# Patient Record
Sex: MALE | Race: WHITE
[De-identification: names, ages, dates, MRNs, and addresses within clinical notes are randomized per-mention and may not be internally consistent; named-entity substitution may affect disease eponyms.]

---

## 2018-01-16 ENCOUNTER — HOSPITAL ENCOUNTER (INPATIENT)
Dept: HOSPITAL 74 - YASAS | Age: 53
LOS: 15 days | Discharge: HOME | DRG: 772 | End: 2018-01-31
Attending: PSYCHIATRY & NEUROLOGY | Admitting: PSYCHIATRY & NEUROLOGY
Payer: COMMERCIAL

## 2018-01-16 VITALS — BODY MASS INDEX: 36 KG/M2

## 2018-01-16 DIAGNOSIS — I25.2: ICD-10-CM

## 2018-01-16 DIAGNOSIS — F11.20: ICD-10-CM

## 2018-01-16 DIAGNOSIS — K21.9: ICD-10-CM

## 2018-01-16 DIAGNOSIS — Z95.5: ICD-10-CM

## 2018-01-16 DIAGNOSIS — I25.118: ICD-10-CM

## 2018-01-16 DIAGNOSIS — I10: ICD-10-CM

## 2018-01-16 DIAGNOSIS — Z91.013: ICD-10-CM

## 2018-01-16 DIAGNOSIS — F19.280: ICD-10-CM

## 2018-01-16 DIAGNOSIS — E78.5: ICD-10-CM

## 2018-01-16 DIAGNOSIS — F13.20: Primary | ICD-10-CM

## 2018-01-16 DIAGNOSIS — B19.20: ICD-10-CM

## 2018-01-16 DIAGNOSIS — F40.01: ICD-10-CM

## 2018-01-16 DIAGNOSIS — F17.213: ICD-10-CM

## 2018-01-16 DIAGNOSIS — F32.9: ICD-10-CM

## 2018-01-16 DIAGNOSIS — F41.9: ICD-10-CM

## 2018-01-16 LAB
APPEARANCE UR: CLEAR
BILIRUB UR STRIP.AUTO-MCNC: NEGATIVE MG/DL
COLOR UR: (no result)
KETONES UR QL STRIP: NEGATIVE
LEUKOCYTE ESTERASE UR QL STRIP.AUTO: NEGATIVE
NITRITE UR QL STRIP: NEGATIVE
PH UR: 5 [PH] (ref 5–8)
PROT UR QL STRIP: NEGATIVE
PROT UR QL STRIP: NEGATIVE
RBC # UR STRIP: NEGATIVE /UL
SP GR UR: 1.01 (ref 1–1.03)
UROBILINOGEN UR STRIP-MCNC: NEGATIVE MG/DL (ref 0.2–1)

## 2018-01-16 PROCEDURE — HZ42ZZZ GROUP COUNSELING FOR SUBSTANCE ABUSE TREATMENT, COGNITIVE-BEHAVIORAL: ICD-10-PCS | Performed by: PSYCHIATRY & NEUROLOGY

## 2018-01-16 RX ADMIN — Medication SCH MG: at 23:09

## 2018-01-16 RX ADMIN — ATORVASTATIN CALCIUM SCH MG: 40 TABLET, FILM COATED ORAL at 23:09

## 2018-01-16 NOTE — HP
Admission ROS S





- [facility]


Chief Complaint: 





I WANT TO GO TO REHAB


Allergies/Adverse Reactions: 


 Allergies











Allergy/AdvReac Type Severity Reaction Status Date / Time


 


shellfish derived Allergy Severe Swelling Verified 18 18:12


 


No Known Drug Allergies Allergy   Verified 18 18:12











History of Present Illness: 





52 YEARS OLD MALE WITH LONG HISTORY OF XANAX NICOTINE DEPENDENCE HAS HEPATITIS 

C GERD HYPERTENSION HYPERLIPIDEMIA HEART ATTACK 2012 DEPRESSION METHADONE 

MAINTENANCE PROGRAM 60 MG PO DAILY IS ADMITTED TO REHAB


Exam Limitations: No Limitations





- Ebola screening


Have you traveled outside of the country in the last 21 days: No


Have you had contact with anyone from an Ebola affected area: No


Have you been sick,other than usual withdrawal symptoms: No


Do you have a fever: No





- Review of Systems


Constitutional: Weight Stable


EENT: reports: No Symptoms Reported


Respiratory: reports: No Symptoms reported


Cardiac: reports: No Symptoms Reported, Other (HEART ATTACK 2012 CARDIAC STENT 

X 2)


GI: reports: Indigestion


: reports: No Symptoms Reported


Musculoskeletal: reports: No Symptoms Reported


Integumentary: reports: No Symptoms Reported


Neuro: reports: No Symptoms reported


Endocrine: reports: No Symptoms Reported


Hematology: reports: No Symptoms Reported


Psychiatric: reports: Judgement Intact, Orientated x3, Depressed


Other Systems: Reviewed and Negative





Patient History





- Patient Medical History


Hx Anemia: No


Hx Asthma: No


Hx Chronic Obstructive Pulmonary Disease (COPD): No


Hx Cancer: No


Hx Cardiac Disorders: Yes (CAD)


Hx Congestive Heart Failure: No


Hx Hypertension: Yes


Hx Hypercholesterolemia: Yes (on med)


Hx Pacemaker: No


HX Cerebrovascular Accident: No


Hx Seizures: No


Hx Dementia: No


Hx Diabetes: No


Hx Gastrointestinal Disorders: Yes


Hx Liver Disease: No


Hx Genitourinary Disorders: No


Hx Sexually Transmitted Disorders: No


Hx Renal Disease (ESRD): No


Hx Thyroid Disease: No


Hx Human Immunodeficiency Virus (HIV): No (last 07/07/15 to 07/11/15 negative)


Hx Hepatitis C: Yes


Hx Depression: Yes


Hx Suicide Attempt: No (denies)


Hx Bipolar Disorder: No


Hx Schizophrenia: No





- Patient Surgical History


Past Surgical History: Yes


Hx Neurologic Surgery: No


Hx Cataract Extraction: No


Hx Cardiac Surgery: Yes (- ptca - 2 stents )


Hx Lung Surgery: No


Hx Breast Surgery: No


Hx Breast Biopsy: No


Hx Abdominal Surgery: No


Hx Appendectomy: No


Hx Cholecystectomy: No


Hx Genitourinary Surgery: No


Hx Orthopedic Surgery: No


Anesthesia Reaction: No





- PPD History


Previous Implant?: Yes


Documented Results: Negative w/o proof


Implanted On Prior R Admission?: No


Date: 16


Results: negative


PPD to be Administered?: Yes





- Smoking Cessation


Smoking history: Current every day smoker


Have you smoked in the past 12 months: Yes


Aproximately how many cigarettes per day: 20


Cigars Per Day: 0


Hx Chewing Tobacco Use: No


Initiated information on smoking cessation: Yes


'Breaking Loose' booklet given: 18





- Substance & Tx. History


Hx Alcohol Use: No


Hx Substance Use: Yes


Substance Use Type: Tranquilizers


Hx Substance Use Treatment: Yes (2018 [facility])





- Substances Abused


  ** Alprazolam (Xanax)


Route: Oral


Frequency: 3-6 times per week


Amount used: 2 MG


Age of first use: 27


Date of Last Use: 18





Family Disease History





- Family Disease History


Family Disease History: Heart Disease: Father (s/p mi  at age 44), 

Mother (qadruple bypass ), Other: Sister (HIV)





Admission Physical Exam [facility]





- Vital Signs


Vital Signs: 


 Vital Signs - 24 hr











  18





  17:27 17:43


 


Temperature 96 F L 97.8 F


 


Pulse Rate 74 88


 


Respiratory 20 20





Rate  


 


Blood Pressure 114/63 161/89














- Physical


General Appearance: Yes: No Apparent Distress, Appropriately Dressed, Obese


HEENTM: Yes: Hearing grossly Normal, Normal ENT Inspection, Normocephalic, 

Normal Voice


Respiratory: Yes: Chest Non-Tender, Lungs Clear, Normal Breath Sounds, No 

Respiratory Distress, No Accessory Muscle Use


Neck: Yes: Supple, Trachea in good position


Breast: Yes: Breasts Symetrical


Cardiology: Yes: Regular Rhythm, Regular Rate, S1, S2


Abdominal: Yes: Normal Bowel Sounds, Non Tender, Soft


Genitourinary: Yes: Within Normal Limits


Back: Yes: Normal Inspection


Musculoskeletal: Yes: full range of Motion, Gait Steady, Muscle Pain (LEFT LEG)


Extremities: Yes: Normal Inspection, Normal Range of Motion, Non-Tender


Neurological: Yes: Fully Oriented, Alert, Motor Strength 5/5, Normal Response, 

Depressed Affect


Integumentary: Yes: Warm


Lymphatic: Yes: Within Normal Limits





- Diagnostic


(1) Hypertension


Current Visit: Yes   Status: Chronic   


Qualifiers: 


   Hypertension type: essential hypertension   Qualified Code(s): I10 - 

Essential (primary) hypertension   





(2) GERD (gastroesophageal reflux disease)


Current Visit: Yes   Status: Chronic   


Qualifiers: 


   Esophagitis presence: without esophagitis   Qualified Code(s): K21.9 - Gastro

-esophageal reflux disease without esophagitis   





(3) History of heart attack


Current Visit: Yes   Status: Resolved   





(4) Alcohol dependence with uncomplicated withdrawal


Current Visit: Yes   Status: Acute   





(5) Nicotine dependence


Current Visit: Yes   Status: Acute   


Qualifiers: 


   Nicotine product type: cigarettes   Substance use status: in withdrawal   

Qualified Code(s): F17.213 - Nicotine dependence, cigarettes, with withdrawal   





(6) Depressive disorder


Current Visit: Yes   Status: Suspected   





(7) Hepatitis C


Current Visit: Yes   Status: Chronic   


Qualifiers: 


   Viral hepatitis chronicity: unspecified   Hepatic coma status: without 

hepatic coma   Qualified Code(s): B19.20 - Unspecified viral hepatitis C 

without hepatic coma   





(8) Methadone maintenance therapy patient


Current Visit: Yes   Status: Chronic   Comment: pt is on 60mg VERIFICATION 

PENDING   





Cleared for Admission [facility]





- Detox or Rehab


[facility] Level of Care: Observation Bed


Detox Regimen/Protocol: Not Applicable


Claeared for Rehab Admission: Yes





[facility] Breath Alcohol Content


Breath Alcohol Content: 0





Urine Drug Screen





- Results


Drug Screen Negative: No


Urine Drug Screen Results: THC-Marijuana





Inpatient Rehab Admission





- Initial Determination


Are CD services needed?: Yes


Free of communicable disease: Yes


Not in need of hospitalization: Yes





- Rehab Admission Criteria


Previous failed treatment: Yes


Poor recovery environment: Yes


Comorbidities: Yes


Lacks judgement: No


Patient is meeting Inpatient Rehab admission criteria:: Yes

## 2018-01-17 LAB
ALBUMIN SERPL-MCNC: 2.8 G/DL (ref 3.4–5)
ALP SERPL-CCNC: 93 U/L (ref 45–117)
ALT SERPL-CCNC: 58 U/L (ref 12–78)
ANION GAP SERPL CALC-SCNC: 8 MMOL/L (ref 8–16)
AST SERPL-CCNC: 33 U/L (ref 15–37)
BILIRUB SERPL-MCNC: 0.5 MG/DL (ref 0.2–1)
BUN SERPL-MCNC: 15 MG/DL (ref 7–18)
CALCIUM SERPL-MCNC: 8.3 MG/DL (ref 8.5–10.1)
CHLORIDE SERPL-SCNC: 103 MMOL/L (ref 98–107)
CO2 SERPL-SCNC: 29 MMOL/L (ref 21–32)
CREAT SERPL-MCNC: 1.1 MG/DL (ref 0.7–1.3)
DEPRECATED RDW RBC AUTO: 15 % (ref 11.9–15.9)
GLUCOSE SERPL-MCNC: 169 MG/DL (ref 74–106)
HCT VFR BLD CALC: 41.3 % (ref 35.4–49)
HGB BLD-MCNC: 13.2 GM/DL (ref 11.7–16.9)
MCH RBC QN AUTO: 28.1 PG (ref 25.7–33.7)
MCHC RBC AUTO-ENTMCNC: 32 G/DL (ref 32–35.9)
MCV RBC: 87.9 FL (ref 80–96)
PLATELET # BLD AUTO: 148 K/MM3 (ref 134–434)
PMV BLD: 9.9 FL (ref 7.5–11.1)
POTASSIUM SERPLBLD-SCNC: 3.7 MMOL/L (ref 3.5–5.1)
PROT SERPL-MCNC: 6.3 G/DL (ref 6.4–8.2)
RBC # BLD AUTO: 4.69 M/MM3 (ref 4–5.6)
SODIUM SERPL-SCNC: 140 MMOL/L (ref 136–145)
WBC # BLD AUTO: 10 K/MM3 (ref 4–10)

## 2018-01-17 RX ADMIN — Medication SCH MG: at 22:15

## 2018-01-17 RX ADMIN — LISINOPRIL SCH MG: 5 TABLET ORAL at 09:25

## 2018-01-17 RX ADMIN — CITALOPRAM HYDROBROMIDE SCH MG: 20 TABLET ORAL at 14:51

## 2018-01-17 RX ADMIN — Medication SCH TAB: at 09:25

## 2018-01-17 RX ADMIN — HYDROXYZINE PAMOATE PRN MG: 50 CAPSULE ORAL at 14:52

## 2018-01-17 RX ADMIN — ATORVASTATIN CALCIUM SCH MG: 40 TABLET, FILM COATED ORAL at 22:15

## 2018-01-17 RX ADMIN — NICOTINE SCH MG: 21 PATCH TRANSDERMAL at 09:25

## 2018-01-17 RX ADMIN — ASPIRIN 81 MG SCH MG: 81 TABLET ORAL at 09:25

## 2018-01-17 RX ADMIN — PANTOPRAZOLE SODIUM SCH MG: 40 TABLET, DELAYED RELEASE ORAL at 09:25
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
## 2018-01-18 RX ADMIN — ATORVASTATIN CALCIUM SCH MG: 40 TABLET, FILM COATED ORAL at 21:08

## 2018-01-18 RX ADMIN — Medication SCH TAB: at 10:14

## 2018-01-18 RX ADMIN — METHADONE HYDROCHLORIDE SCH MG: 40 TABLET ORAL at 06:26

## 2018-01-18 RX ADMIN — ASPIRIN 81 MG SCH MG: 81 TABLET ORAL at 10:14

## 2018-01-18 RX ADMIN — NICOTINE SCH MG: 21 PATCH TRANSDERMAL at 10:14

## 2018-01-18 RX ADMIN — LISINOPRIL SCH MG: 5 TABLET ORAL at 10:14

## 2018-01-18 RX ADMIN — HYDROXYZINE PAMOATE PRN MG: 50 CAPSULE ORAL at 21:08

## 2018-01-18 RX ADMIN — Medication SCH MG: at 21:08

## 2018-01-18 RX ADMIN — HYDROXYZINE PAMOATE PRN MG: 50 CAPSULE ORAL at 10:16

## 2018-01-18 RX ADMIN — PANTOPRAZOLE SODIUM SCH MG: 40 TABLET, DELAYED RELEASE ORAL at 10:15

## 2018-01-18 RX ADMIN — CITALOPRAM HYDROBROMIDE SCH MG: 20 TABLET ORAL at 10:14

## 2018-01-19 RX ADMIN — HYDROXYZINE PAMOATE PRN MG: 50 CAPSULE ORAL at 10:26

## 2018-01-19 RX ADMIN — METHADONE HYDROCHLORIDE SCH MG: 40 TABLET ORAL at 06:46

## 2018-01-19 RX ADMIN — Medication SCH MG: at 21:37

## 2018-01-19 RX ADMIN — Medication SCH TAB: at 10:24

## 2018-01-19 RX ADMIN — PANTOPRAZOLE SODIUM SCH MG: 40 TABLET, DELAYED RELEASE ORAL at 10:25

## 2018-01-19 RX ADMIN — LISINOPRIL SCH MG: 5 TABLET ORAL at 10:27

## 2018-01-19 RX ADMIN — ATORVASTATIN CALCIUM SCH MG: 40 TABLET, FILM COATED ORAL at 21:37

## 2018-01-19 RX ADMIN — CITALOPRAM HYDROBROMIDE SCH MG: 20 TABLET ORAL at 10:25

## 2018-01-19 RX ADMIN — ASPIRIN 81 MG SCH MG: 81 TABLET ORAL at 10:25

## 2018-01-19 RX ADMIN — HYDROXYZINE PAMOATE PRN MG: 50 CAPSULE ORAL at 15:59

## 2018-01-19 RX ADMIN — NICOTINE SCH MG: 21 PATCH TRANSDERMAL at 10:25

## 2018-01-20 RX ADMIN — ASPIRIN 81 MG SCH MG: 81 TABLET ORAL at 10:23

## 2018-01-20 RX ADMIN — HYDROXYZINE PAMOATE PRN MG: 50 CAPSULE ORAL at 10:24

## 2018-01-20 RX ADMIN — LISINOPRIL SCH MG: 5 TABLET ORAL at 10:59

## 2018-01-20 RX ADMIN — PANTOPRAZOLE SODIUM SCH MG: 40 TABLET, DELAYED RELEASE ORAL at 10:23

## 2018-01-20 RX ADMIN — NICOTINE SCH MG: 21 PATCH TRANSDERMAL at 10:23

## 2018-01-20 RX ADMIN — CITALOPRAM HYDROBROMIDE SCH MG: 20 TABLET ORAL at 10:23

## 2018-01-20 RX ADMIN — METHADONE HYDROCHLORIDE SCH MG: 40 TABLET ORAL at 06:30

## 2018-01-20 RX ADMIN — Medication SCH MG: at 21:19

## 2018-01-20 RX ADMIN — ATORVASTATIN CALCIUM SCH MG: 40 TABLET, FILM COATED ORAL at 21:19

## 2018-01-20 RX ADMIN — Medication SCH TAB: at 10:23

## 2018-01-20 RX ADMIN — HYDROXYZINE PAMOATE PRN MG: 50 CAPSULE ORAL at 21:19

## 2018-01-21 RX ADMIN — HYDROXYZINE PAMOATE PRN MG: 50 CAPSULE ORAL at 10:12

## 2018-01-21 RX ADMIN — Medication SCH MG: at 21:51

## 2018-01-21 RX ADMIN — CITALOPRAM HYDROBROMIDE SCH MG: 20 TABLET ORAL at 10:10

## 2018-01-21 RX ADMIN — METHADONE HYDROCHLORIDE SCH MG: 40 TABLET ORAL at 06:02

## 2018-01-21 RX ADMIN — PANTOPRAZOLE SODIUM SCH MG: 40 TABLET, DELAYED RELEASE ORAL at 10:10

## 2018-01-21 RX ADMIN — NICOTINE SCH MG: 21 PATCH TRANSDERMAL at 10:13

## 2018-01-21 RX ADMIN — Medication SCH TAB: at 10:10

## 2018-01-21 RX ADMIN — LISINOPRIL SCH MG: 5 TABLET ORAL at 10:13

## 2018-01-21 RX ADMIN — ASPIRIN 81 MG SCH MG: 81 TABLET ORAL at 10:10

## 2018-01-21 RX ADMIN — HYDROXYZINE PAMOATE PRN MG: 50 CAPSULE ORAL at 21:51

## 2018-01-21 RX ADMIN — ATORVASTATIN CALCIUM SCH MG: 40 TABLET, FILM COATED ORAL at 21:51

## 2018-01-22 RX ADMIN — HYDROXYZINE PAMOATE PRN MG: 50 CAPSULE ORAL at 16:02

## 2018-01-22 RX ADMIN — METHADONE HYDROCHLORIDE SCH MG: 40 TABLET ORAL at 06:13

## 2018-01-22 RX ADMIN — Medication SCH TAB: at 10:00

## 2018-01-22 RX ADMIN — PANTOPRAZOLE SODIUM SCH MG: 40 TABLET, DELAYED RELEASE ORAL at 10:00

## 2018-01-22 RX ADMIN — CITALOPRAM HYDROBROMIDE SCH MG: 20 TABLET ORAL at 10:00

## 2018-01-22 RX ADMIN — HYDROXYZINE PAMOATE PRN MG: 50 CAPSULE ORAL at 21:51

## 2018-01-22 RX ADMIN — ASPIRIN 81 MG SCH MG: 81 TABLET ORAL at 10:00

## 2018-01-22 RX ADMIN — Medication SCH MG: at 21:51

## 2018-01-22 RX ADMIN — ATORVASTATIN CALCIUM SCH MG: 40 TABLET, FILM COATED ORAL at 21:51

## 2018-01-22 RX ADMIN — HYDROXYZINE PAMOATE PRN MG: 50 CAPSULE ORAL at 10:01

## 2018-01-22 RX ADMIN — LISINOPRIL SCH MG: 5 TABLET ORAL at 10:00

## 2018-01-22 RX ADMIN — NICOTINE SCH MG: 21 PATCH TRANSDERMAL at 10:00

## 2018-01-22 NOTE — PN
Psychiatric Progress Note


Vital Signs: 


 Vital Signs











 Period  Temp  Pulse  Resp  BP Sys/Coronado  Pulse Ox


 


 Last 24 Hr  98.3 F  71-80  18-18  118-126/71-74  











Date of Session: 01/22/18


Chief Complaint:: " I feel depressed"


HPI: Patient addressing Sedative Dependence comorbid with Opoid Dependence on 

Agonist Therapy, Anxiety Disorder, Depressive Disorder and Substance-induced 

Anxiety Disorder


ROS: GERD, Hep C, HTN, HLD, MI,


Current Medications: 


Active Medications











Generic Name Dose Route Start Last Admin





  Trade Name Freq  PRN Reason Stop Dose Admin


 


Acetaminophen  650 mg  01/16/18 20:10  





  Tylenol -  PO   





  Q4H PRN   





  FEVER   


 


Al Hydroxide/Mg Hydroxide  30 ml  01/16/18 20:10  





  Mylanta Oral Suspension -  PO   





  Q6H PRN   





  DYSPEPSIA   


 


Aspirin  81 mg  01/17/18 10:00  01/22/18 10:00





  Asa -  PO   81 mg





  DAILY JUAN   Administration


 


Atorvastatin Calcium  40 mg  01/16/18 22:00  01/21/18 21:51





  Lipitor -  PO   40 mg





  HS JUAN   Administration


 


Citalopram Hydrobromide  30 mg  01/23/18 10:00  





  Celexa -  PO   





  DAILY JUAN   


 


Eucalyptus/Menthol/Phenol/Sorbitol  1 each  01/16/18 20:10  





  Cepastat Lozenge -  MM   





  Q4H PRN   





  SORE THROAT   


 


Guaifenesin  10 ml  01/16/18 20:10  





  Robitussin Dm -  PO   





  Q6H PRN   





  COUGH   


 


Hydroxyzine Pamoate  50 mg  01/17/18 12:48  01/22/18 10:01





  Vistaril -  PO   50 mg





  Q4H PRN   Administration





  ANXIETY   


 


Lisinopril  2.5 mg  01/17/18 10:00  01/22/18 10:00





  Prinivil  PO   2.5 mg





  DAILY JUAN   Administration


 


Loperamide HCl  4 mg  01/16/18 20:10  





  Imodium -  PO   





  Q6H PRN   





  DIARRHEA   


 


Magnesium Citrate  300 ml  01/16/18 20:10  





  Citroma -  PO   





  Q48H PRN   





  CONSTIPATION   


 


Magnesium Hydroxide  30 ml  01/16/18 20:10  





  Milk Of Magnesia -  PO   





  DAILY PRN   





  CONSTIPATION   


 


Methadone HCl 40 mg/ Methadone  60 mg  01/18/18 06:00  01/22/18 06:13





  HCl 20 mg  PO  01/24/18 05:59  60 mg





  DAILY@0600 JUAN   Administration


 


Methocarbamol  500 mg  01/16/18 20:12  





  Robaxin -  PO   





  Q8H PRN   





  BACK PAIN   


 


Nicotine  21 mg  01/17/18 10:00  01/22/18 10:00





  Nicoderm Patch -  TD   21 mg





  DAILY JUAN   Administration


 


Nicotine Polacrilex  2 mg  01/16/18 20:10  





  Nicorette Gum -  BUC   





  Q2H PRN   





  NICOTINE REPLACEMENT RX   


 


Pantoprazole Sodium  40 mg  01/17/18 10:00  01/22/18 10:00





  Protonix -  PO   40 mg





  DAILY JUAN   Administration


 


Prenatal Multivit/Folic Acid/Iron  1 tab  01/17/18 10:00  01/22/18 10:00





  Prenatal Vitamins (Sjr) -  PO   1 tab





  DAILY JUAN   Administration


 


Pseudoephedrine/Triprolidine  1 combo  01/16/18 20:10  





  Actifed -  PO   





  TID PRN   





  NASAL CONGESTION   


 


Thiamine HCl  100 mg  01/16/18 22:00  01/21/18 21:51





  Vitamin B1 -  PO   100 mg





  HS JUAN   Administration











Medication(s) Change(s): Increase Celexa dosage to 30 mg po daily


Current Side Effect: No


Lab tests ordered: Yes


Lab tests reviewed: Yes


Provider note:: Patient reports that he has been feeling depressed despite 

taking Celexa 20 mg po daily. Claims that his usual dose of Celexa is 30 mg/

day. Requests for medication to be order at that dose


Total face to face time:: 25





Mental Status Exam





- Mental Status Exam


Alert and Oriented to: Time, Place, Person


Cognitive Function: Fair


Patient Appearance: Well Groomed


Mood: Depressed


Affect: Appropriate


Patient Behavior: Cooperative


Speech Pattern: Clear


Voice Loudness: Normal


Thought Process: Intact, Goal Oriented


Thought Disorder: Not Present


Hallucinations: Denies


Suicidal Ideation: Denies


Homicidal Ideation: Denies


Insight/Judgement: Fair


Sleep: Fair


Appetite: Good


Muscle strength/Tone: Normal


Gait/Station: Normal





Psychiatric Treatment Plan





- Problem List


(1) Sedative hypnotic or anxiolytic dependence


Current Visit: Yes   





(2) Opioid dependence on agonist therapy


Current Visit: No   





(3) Nicotine dependence


Current Visit: Yes   


Qualifiers: 


   Nicotine product type: cigarettes   Substance use status: in withdrawal   

Qualified Code(s): F17.213 - Nicotine dependence, cigarettes, with withdrawal   





(4) Anxiety disorder


Current Visit: Yes   





(5) Panic disorder with agoraphobia


Current Visit: Yes   





(6) Depressive disorder


Current Visit: Yes   





(7) MDD (major depressive disorder)


Current Visit: Yes   





(8) Substance-induced anxiety disorder


Current Visit: Yes   





(9) GERD (gastroesophageal reflux disease)


Current Visit: Yes   


Qualifiers: 


   Esophagitis presence: without esophagitis   Qualified Code(s): K21.9 - Gastro

-esophageal reflux disease without esophagitis   





(10) Hepatitis C


Current Visit: Yes   


Qualifiers: 


   Viral hepatitis chronicity: unspecified   Hepatic coma status: without 

hepatic coma   Qualified Code(s): B19.20 - Unspecified viral hepatitis C 

without hepatic coma   





(11) Hypertension


Current Visit: Yes   


Qualifiers: 


   Hypertension type: essential hypertension   Qualified Code(s): I10 - 

Essential (primary) hypertension   





(12) History of heart attack


Current Visit: Yes   





(13) CAD (coronary artery disease)


Current Visit: No   


Qualifiers: 


   Coronary Disease-Associated Artery/Lesion type: native artery   Native vs. 

transplanted heart: native heart   Associated angina: with stable angina   

Qualified Code(s): I25.118 - Atherosclerotic heart disease of native coronary 

artery with other forms of angina pectoris   





(14) Hyperlipidemia


Current Visit: Yes   


Initial treatment plan: 1) Discontinue Celexa 20 mg po daily.  2) Start Celexa 

30 mg po daily.  3) Monitor progress

## 2018-01-23 RX ADMIN — NICOTINE SCH MG: 21 PATCH TRANSDERMAL at 10:16

## 2018-01-23 RX ADMIN — Medication SCH MG: at 21:31

## 2018-01-23 RX ADMIN — HYDROXYZINE PAMOATE PRN MG: 50 CAPSULE ORAL at 21:31

## 2018-01-23 RX ADMIN — PANTOPRAZOLE SODIUM SCH MG: 40 TABLET, DELAYED RELEASE ORAL at 10:15

## 2018-01-23 RX ADMIN — METHADONE HYDROCHLORIDE SCH MG: 40 TABLET ORAL at 06:17

## 2018-01-23 RX ADMIN — CITALOPRAM HYDROBROMIDE SCH MG: 10 TABLET ORAL at 10:16

## 2018-01-23 RX ADMIN — HYDROXYZINE PAMOATE PRN MG: 50 CAPSULE ORAL at 10:17

## 2018-01-23 RX ADMIN — ASPIRIN 81 MG SCH MG: 81 TABLET ORAL at 10:15

## 2018-01-23 RX ADMIN — LISINOPRIL SCH MG: 5 TABLET ORAL at 10:16

## 2018-01-23 RX ADMIN — ATORVASTATIN CALCIUM SCH MG: 40 TABLET, FILM COATED ORAL at 21:31

## 2018-01-23 RX ADMIN — Medication SCH TAB: at 10:16

## 2018-01-24 RX ADMIN — Medication SCH MG: at 21:28

## 2018-01-24 RX ADMIN — PANTOPRAZOLE SODIUM SCH MG: 40 TABLET, DELAYED RELEASE ORAL at 10:11

## 2018-01-24 RX ADMIN — METHADONE HYDROCHLORIDE SCH MG: 40 TABLET ORAL at 06:38

## 2018-01-24 RX ADMIN — NICOTINE SCH MG: 21 PATCH TRANSDERMAL at 10:10

## 2018-01-24 RX ADMIN — HYDROXYZINE PAMOATE PRN MG: 50 CAPSULE ORAL at 15:31

## 2018-01-24 RX ADMIN — Medication SCH TAB: at 10:10

## 2018-01-24 RX ADMIN — CITALOPRAM HYDROBROMIDE SCH MG: 10 TABLET ORAL at 10:11

## 2018-01-24 RX ADMIN — ATORVASTATIN CALCIUM SCH MG: 40 TABLET, FILM COATED ORAL at 21:28

## 2018-01-24 RX ADMIN — HYDROXYZINE PAMOATE PRN MG: 50 CAPSULE ORAL at 21:28

## 2018-01-24 RX ADMIN — LISINOPRIL SCH MG: 5 TABLET ORAL at 10:11

## 2018-01-24 RX ADMIN — HYDROXYZINE PAMOATE PRN MG: 50 CAPSULE ORAL at 10:12

## 2018-01-24 RX ADMIN — ASPIRIN 81 MG SCH MG: 81 TABLET ORAL at 10:11

## 2018-01-25 RX ADMIN — ASPIRIN 81 MG SCH MG: 81 TABLET ORAL at 10:10

## 2018-01-25 RX ADMIN — Medication SCH TAB: at 10:10

## 2018-01-25 RX ADMIN — NICOTINE SCH MG: 21 PATCH TRANSDERMAL at 10:10

## 2018-01-25 RX ADMIN — Medication SCH MG: at 21:39

## 2018-01-25 RX ADMIN — ATORVASTATIN CALCIUM SCH MG: 40 TABLET, FILM COATED ORAL at 21:39

## 2018-01-25 RX ADMIN — LISINOPRIL SCH MG: 5 TABLET ORAL at 10:10

## 2018-01-25 RX ADMIN — HYDROXYZINE PAMOATE PRN MG: 50 CAPSULE ORAL at 22:14

## 2018-01-25 RX ADMIN — HYDROXYZINE PAMOATE PRN MG: 50 CAPSULE ORAL at 10:11

## 2018-01-25 RX ADMIN — CITALOPRAM HYDROBROMIDE SCH MG: 10 TABLET ORAL at 11:00

## 2018-01-25 RX ADMIN — PANTOPRAZOLE SODIUM SCH MG: 40 TABLET, DELAYED RELEASE ORAL at 10:10

## 2018-01-25 RX ADMIN — METHADONE HYDROCHLORIDE SCH MG: 40 TABLET ORAL at 05:59

## 2018-01-26 RX ADMIN — CITALOPRAM HYDROBROMIDE SCH MG: 10 TABLET ORAL at 10:23

## 2018-01-26 RX ADMIN — ATORVASTATIN CALCIUM SCH MG: 40 TABLET, FILM COATED ORAL at 21:46

## 2018-01-26 RX ADMIN — HYDROXYZINE PAMOATE PRN MG: 50 CAPSULE ORAL at 21:45

## 2018-01-26 RX ADMIN — ASPIRIN 81 MG SCH MG: 81 TABLET ORAL at 10:23

## 2018-01-26 RX ADMIN — METHADONE HYDROCHLORIDE SCH MG: 40 TABLET ORAL at 06:02

## 2018-01-26 RX ADMIN — NICOTINE SCH MG: 21 PATCH TRANSDERMAL at 10:23

## 2018-01-26 RX ADMIN — Medication SCH TAB: at 10:23

## 2018-01-26 RX ADMIN — HYDROXYZINE PAMOATE PRN MG: 50 CAPSULE ORAL at 10:23

## 2018-01-26 RX ADMIN — Medication SCH MG: at 21:45

## 2018-01-26 RX ADMIN — HYDROXYZINE PAMOATE PRN MG: 50 CAPSULE ORAL at 14:43

## 2018-01-26 RX ADMIN — LISINOPRIL SCH MG: 5 TABLET ORAL at 10:23

## 2018-01-26 RX ADMIN — PANTOPRAZOLE SODIUM SCH MG: 40 TABLET, DELAYED RELEASE ORAL at 10:23

## 2018-01-27 RX ADMIN — PANTOPRAZOLE SODIUM SCH MG: 40 TABLET, DELAYED RELEASE ORAL at 10:02

## 2018-01-27 RX ADMIN — ASPIRIN 81 MG SCH MG: 81 TABLET ORAL at 10:01

## 2018-01-27 RX ADMIN — METHADONE HYDROCHLORIDE SCH MG: 40 TABLET ORAL at 06:18

## 2018-01-27 RX ADMIN — ATORVASTATIN CALCIUM SCH MG: 40 TABLET, FILM COATED ORAL at 21:56

## 2018-01-27 RX ADMIN — HYDROXYZINE PAMOATE PRN MG: 50 CAPSULE ORAL at 10:03

## 2018-01-27 RX ADMIN — LISINOPRIL SCH MG: 5 TABLET ORAL at 10:02

## 2018-01-27 RX ADMIN — Medication SCH MG: at 21:56

## 2018-01-27 RX ADMIN — NICOTINE SCH MG: 21 PATCH TRANSDERMAL at 10:01

## 2018-01-27 RX ADMIN — Medication SCH TAB: at 10:02

## 2018-01-27 RX ADMIN — CITALOPRAM HYDROBROMIDE SCH MG: 10 TABLET ORAL at 10:01

## 2018-01-27 RX ADMIN — HYDROXYZINE PAMOATE PRN MG: 50 CAPSULE ORAL at 21:57

## 2018-01-28 RX ADMIN — LISINOPRIL SCH MG: 5 TABLET ORAL at 09:57

## 2018-01-28 RX ADMIN — PANTOPRAZOLE SODIUM SCH MG: 40 TABLET, DELAYED RELEASE ORAL at 09:57

## 2018-01-28 RX ADMIN — Medication SCH TAB: at 09:57

## 2018-01-28 RX ADMIN — NICOTINE SCH: 21 PATCH TRANSDERMAL at 09:57

## 2018-01-28 RX ADMIN — ATORVASTATIN CALCIUM SCH MG: 40 TABLET, FILM COATED ORAL at 22:01

## 2018-01-28 RX ADMIN — METHADONE HYDROCHLORIDE SCH MG: 40 TABLET ORAL at 06:11

## 2018-01-28 RX ADMIN — HYDROXYZINE PAMOATE PRN MG: 50 CAPSULE ORAL at 09:58

## 2018-01-28 RX ADMIN — CITALOPRAM HYDROBROMIDE SCH MG: 10 TABLET ORAL at 09:57

## 2018-01-28 RX ADMIN — ASPIRIN 81 MG SCH MG: 81 TABLET ORAL at 09:57

## 2018-01-28 RX ADMIN — HYDROXYZINE PAMOATE PRN MG: 50 CAPSULE ORAL at 22:02

## 2018-01-28 RX ADMIN — Medication SCH MG: at 22:01

## 2018-01-29 RX ADMIN — CITALOPRAM HYDROBROMIDE SCH MG: 10 TABLET ORAL at 10:20

## 2018-01-29 RX ADMIN — ATORVASTATIN CALCIUM SCH MG: 40 TABLET, FILM COATED ORAL at 21:31

## 2018-01-29 RX ADMIN — Medication SCH TAB: at 10:21

## 2018-01-29 RX ADMIN — NICOTINE SCH: 21 PATCH TRANSDERMAL at 10:21

## 2018-01-29 RX ADMIN — LISINOPRIL SCH MG: 5 TABLET ORAL at 10:21

## 2018-01-29 RX ADMIN — ASPIRIN 81 MG SCH MG: 81 TABLET ORAL at 10:20

## 2018-01-29 RX ADMIN — HYDROXYZINE PAMOATE PRN MG: 50 CAPSULE ORAL at 10:20

## 2018-01-29 RX ADMIN — Medication SCH MG: at 21:32

## 2018-01-29 RX ADMIN — METHADONE HYDROCHLORIDE SCH MG: 40 TABLET ORAL at 06:06

## 2018-01-29 RX ADMIN — PANTOPRAZOLE SODIUM SCH MG: 40 TABLET, DELAYED RELEASE ORAL at 10:21

## 2018-01-29 RX ADMIN — HYDROXYZINE PAMOATE PRN MG: 50 CAPSULE ORAL at 21:32

## 2018-01-30 RX ADMIN — LISINOPRIL SCH MG: 5 TABLET ORAL at 10:11

## 2018-01-30 RX ADMIN — HYDROXYZINE PAMOATE PRN MG: 50 CAPSULE ORAL at 10:10

## 2018-01-30 RX ADMIN — PANTOPRAZOLE SODIUM SCH MG: 40 TABLET, DELAYED RELEASE ORAL at 10:11

## 2018-01-30 RX ADMIN — METHADONE HYDROCHLORIDE SCH MG: 40 TABLET ORAL at 06:15

## 2018-01-30 RX ADMIN — Medication SCH TAB: at 10:13

## 2018-01-30 RX ADMIN — Medication SCH MG: at 21:57

## 2018-01-30 RX ADMIN — ASPIRIN 81 MG SCH MG: 81 TABLET ORAL at 10:11

## 2018-01-30 RX ADMIN — ATORVASTATIN CALCIUM SCH MG: 40 TABLET, FILM COATED ORAL at 21:57

## 2018-01-30 RX ADMIN — CITALOPRAM HYDROBROMIDE SCH MG: 10 TABLET ORAL at 10:11

## 2018-01-30 RX ADMIN — NICOTINE SCH: 21 PATCH TRANSDERMAL at 10:12

## 2018-01-31 VITALS — TEMPERATURE: 97.6 F | SYSTOLIC BLOOD PRESSURE: 148 MMHG | HEART RATE: 62 BPM | DIASTOLIC BLOOD PRESSURE: 76 MMHG

## 2018-01-31 RX ADMIN — NICOTINE SCH: 21 PATCH TRANSDERMAL at 09:57

## 2018-01-31 RX ADMIN — PANTOPRAZOLE SODIUM SCH MG: 40 TABLET, DELAYED RELEASE ORAL at 09:57

## 2018-01-31 RX ADMIN — ASPIRIN 81 MG SCH MG: 81 TABLET ORAL at 09:57

## 2018-01-31 RX ADMIN — HYDROXYZINE PAMOATE PRN MG: 50 CAPSULE ORAL at 09:58

## 2018-01-31 RX ADMIN — Medication SCH TAB: at 09:56

## 2018-01-31 RX ADMIN — LISINOPRIL SCH MG: 5 TABLET ORAL at 09:59

## 2018-01-31 RX ADMIN — CITALOPRAM HYDROBROMIDE SCH MG: 10 TABLET ORAL at 09:56

## 2018-01-31 NOTE — PN
Psychiatric Progress Note


Vital Signs: 


 Vital Signs











 Period  Temp  Pulse  Resp  BP Sys/Coronado  Pulse Ox


 


 Last 24 Hr  97.6 F  62-74  18-20  122-148/71-76  











Date of Session: 01/31/18


Chief Complaint:: Discharge Note


HPI: Patient addressing Sedative Dependence comorbid with Opoid Dependence, 

Nicotine Dependence, Anxiety Disorder, Depressive Disorder and Substance-

induced Anxiety Disorder


ROS: GERD, Hep C, HTN, HLD, S/P MI were medically managed


Current Medications: 


Active Medications











Generic Name Dose Route Start Last Admin





  Trade Name Freq  PRN Reason Stop Dose Admin


 


Acetaminophen  650 mg  01/16/18 20:10  





  Tylenol -  PO   





  Q4H PRN   





  FEVER   


 


Al Hydroxide/Mg Hydroxide  30 ml  01/16/18 20:10  





  Mylanta Oral Suspension -  PO   





  Q6H PRN   





  DYSPEPSIA   


 


Aspirin  81 mg  01/17/18 10:00  01/30/18 10:11





  Asa -  PO   81 mg





  DAILY JUAN   Administration


 


Atorvastatin Calcium  40 mg  01/16/18 22:00  01/30/18 21:57





  Lipitor -  PO   40 mg





  HS JUAN   Administration


 


Citalopram Hydrobromide  30 mg  01/23/18 10:00  01/30/18 10:11





  Celexa -  PO   30 mg





  DAILY JUAN   Administration


 


Eucalyptus/Menthol/Phenol/Sorbitol  1 each  01/16/18 20:10  





  Cepastat Lozenge -  MM   





  Q4H PRN   





  SORE THROAT   


 


Guaifenesin  10 ml  01/16/18 20:10  





  Robitussin Dm -  PO   





  Q6H PRN   





  COUGH   


 


Hydroxyzine Pamoate  50 mg  01/17/18 12:48  01/30/18 10:10





  Vistaril -  PO   50 mg





  Q4H PRN   Administration





  ANXIETY   


 


Lisinopril  2.5 mg  01/17/18 10:00  01/30/18 10:11





  Prinivil  PO   2.5 mg





  DAILY JUAN   Administration


 


Loperamide HCl  4 mg  01/16/18 20:10  





  Imodium -  PO   





  Q6H PRN   





  DIARRHEA   


 


Magnesium Citrate  300 ml  01/16/18 20:10  





  Citroma -  PO   





  Q48H PRN   





  CONSTIPATION   


 


Magnesium Hydroxide  30 ml  01/16/18 20:10  





  Milk Of Magnesia -  PO   





  DAILY PRN   





  CONSTIPATION   


 


Methadone HCl 40 mg/ Methadone  60 mg  01/31/18 06:00  01/31/18 06:06





  HCl 20 mg  PO  02/07/18 05:59  60 mg





  DAILY@0600 JUAN   Administration


 


Methocarbamol  500 mg  01/16/18 20:12  





  Robaxin -  PO   





  Q8H PRN   





  BACK PAIN   


 


Nicotine  21 mg  01/17/18 10:00  01/30/18 10:12





  Nicoderm Patch -  TD   Not Given





  DAILY JUAN   


 


Nicotine Polacrilex  2 mg  01/16/18 20:10  





  Nicorette Gum -  BUC   





  Q2H PRN   





  NICOTINE REPLACEMENT RX   


 


Pantoprazole Sodium  40 mg  01/17/18 10:00  01/30/18 10:11





  Protonix -  PO   40 mg





  DAILY JUAN   Administration


 


Prenatal Multivit/Folic Acid/Iron  1 tab  01/17/18 10:00  01/30/18 10:13





  Prenatal Vitamins (Sjr) -  PO   1 tab





  DAILY JUAN   Administration


 


Pseudoephedrine/Triprolidine  1 combo  01/16/18 20:10  





  Actifed -  PO   





  TID PRN   





  NASAL CONGESTION   


 


Thiamine HCl  100 mg  01/16/18 22:00  01/30/18 21:57





  Vitamin B1 -  PO   100 mg





  HS JUAN   Administration











Current Side Effect: No


Lab tests ordered: Yes


Lab tests reviewed: Yes


Provider note:: Patient has complete this program today. He has met his 

treatment goals and will continue to address his issues in outpatient treatment 

at [facility]. Told writer that from his participation in this program, he 

has learned the importance of making meetings and have a sponsor. He responded 

well to Celexa 30 mg po daily. Script for 30 days supply of that medication is 

electronically transmitted to [facility] at [address] [place] [address] [phone]. 

He is stable for discharge today


Total face to face time:: 35





Mental Status Exam





- Mental Status Exam


Alert and Oriented to: Time, Place, Person


Cognitive Function: Fair


Patient Appearance: Well Groomed


Mood: Hopeful, Euthymic


Affect: Appropriate


Patient Behavior: Cooperative


Speech Pattern: Clear, Artificially Ventilated


Voice Loudness: Limited Variation


Thought Process: Goal Oriented


Thought Disorder: Not Present


Hallucinations: Denies


Suicidal Ideation: Denies


Homicidal Ideation: Denies


Insight/Judgement: Fair


Sleep: Fair


Appetite: Good


Muscle strength/Tone: Normal


Gait/Station: Normal





Psychiatric Treatment Plan





- Problem List


(1) Sedative hypnotic or anxiolytic dependence


Current Visit: Yes   





(2) Opioid dependence on agonist therapy


Current Visit: No   





(3) Nicotine dependence


Current Visit: Yes   


Qualifiers: 


   Nicotine product type: cigarettes   Substance use status: in withdrawal   

Qualified Code(s): F17.213 - Nicotine dependence, cigarettes, with withdrawal   





(4) Anxiety disorder


Current Visit: Yes   





(5) Panic disorder with agoraphobia


Current Visit: Yes   





(6) Depressive disorder


Current Visit: Yes   





(7) MDD (major depressive disorder)


Current Visit: Yes   





(8) Substance-induced anxiety disorder


Current Visit: Yes   





(9) GERD (gastroesophageal reflux disease)


Current Visit: Yes   


Qualifiers: 


   Esophagitis presence: without esophagitis   Qualified Code(s): K21.9 - Gastro

-esophageal reflux disease without esophagitis   





(10) Hepatitis C


Current Visit: Yes   


Qualifiers: 


   Viral hepatitis chronicity: unspecified   Hepatic coma status: without 

hepatic coma   Qualified Code(s): B19.20 - Unspecified viral hepatitis C 

without hepatic coma   





(11) Hypertension


Current Visit: Yes   


Qualifiers: 


   Hypertension type: essential hypertension   Qualified Code(s): I10 - 

Essential (primary) hypertension   





(12) History of heart attack


Current Visit: Yes   





(13) CAD (coronary artery disease)


Current Visit: No   


Qualifiers: 


   Coronary Disease-Associated Artery/Lesion type: native artery   Native vs. 

transplanted heart: native heart   Associated angina: with stable angina   

Qualified Code(s): I25.118 - Atherosclerotic heart disease of native coronary 

artery with other forms of angina pectoris   





(14) Hyperlipidemia


Current Visit: Yes   


Initial treatment plan: Patient is discharged today and referred to [facility] for 

outpatient treatment

## 2019-08-08 ENCOUNTER — HOSPITAL ENCOUNTER (INPATIENT)
Dept: HOSPITAL 74 - YASAS | Age: 54
LOS: 22 days | Discharge: HOME | DRG: 772 | End: 2019-08-30
Attending: NEUROMUSCULOSKELETAL MEDICINE & OMM | Admitting: NEUROMUSCULOSKELETAL MEDICINE & OMM
Payer: COMMERCIAL

## 2019-08-08 VITALS — BODY MASS INDEX: 34.6 KG/M2

## 2019-08-08 DIAGNOSIS — Z95.5: ICD-10-CM

## 2019-08-08 DIAGNOSIS — F14.20: ICD-10-CM

## 2019-08-08 DIAGNOSIS — F13.20: ICD-10-CM

## 2019-08-08 DIAGNOSIS — I25.2: ICD-10-CM

## 2019-08-08 DIAGNOSIS — K21.9: ICD-10-CM

## 2019-08-08 DIAGNOSIS — Z95.1: ICD-10-CM

## 2019-08-08 DIAGNOSIS — I25.119: ICD-10-CM

## 2019-08-08 DIAGNOSIS — F41.0: ICD-10-CM

## 2019-08-08 DIAGNOSIS — G47.00: ICD-10-CM

## 2019-08-08 DIAGNOSIS — Z86.711: ICD-10-CM

## 2019-08-08 DIAGNOSIS — Z79.01: ICD-10-CM

## 2019-08-08 DIAGNOSIS — I10: ICD-10-CM

## 2019-08-08 DIAGNOSIS — F32.9: ICD-10-CM

## 2019-08-08 DIAGNOSIS — E78.5: ICD-10-CM

## 2019-08-08 DIAGNOSIS — F17.210: ICD-10-CM

## 2019-08-08 DIAGNOSIS — F40.00: ICD-10-CM

## 2019-08-08 DIAGNOSIS — F11.20: ICD-10-CM

## 2019-08-08 DIAGNOSIS — F10.20: Primary | ICD-10-CM

## 2019-08-08 PROCEDURE — HZ42ZZZ GROUP COUNSELING FOR SUBSTANCE ABUSE TREATMENT, COGNITIVE-BEHAVIORAL: ICD-10-PCS | Performed by: ALLERGY & IMMUNOLOGY

## 2019-08-08 RX ADMIN — ATORVASTATIN CALCIUM SCH MG: 40 TABLET, FILM COATED ORAL at 21:41

## 2019-08-08 RX ADMIN — Medication SCH MG: at 23:25

## 2019-08-08 RX ADMIN — METOPROLOL TARTRATE SCH: 25 TABLET, FILM COATED ORAL at 21:41

## 2019-08-08 RX ADMIN — NICOTINE SCH: 14 PATCH, EXTENDED RELEASE TRANSDERMAL at 20:33

## 2019-08-08 RX ADMIN — ASPIRIN 81 MG SCH MG: 81 TABLET ORAL at 21:40

## 2019-08-08 RX ADMIN — MONTELUKAST SODIUM SCH: 10 TABLET, COATED ORAL at 23:25

## 2019-08-08 RX ADMIN — RANITIDINE SCH: 150 TABLET ORAL at 23:25

## 2019-08-08 NOTE — PN
Teaching Attending Note


Name of Resident: Ella Avalos





ATTENDING PHYSICIAN STATEMENT





I saw and evaluated the patient.


I reviewed the resident's note and discussed the case with the resident.


I agree with the resident's findings and plan as documented.








SUBJECTIVE: 54 yo with CAD/MI/stent/CABG 2006 and HCV not treated, anxiety, 

here for rehab after completing detox for alcohol/benzo/heroin. In a methadone 

MAT. PCP- 


Was using benzo few times a week, heroin 2 bags IV/day.


using crack cocaine





OBJECTIVE:


 Vital Signs - 24 hr











  08/08/19





  15:09


 


Temperature 98.4 F


 


Pulse Rate 55 L


 


Respiratory 18





Rate 


 


Blood Pressure 123/71








alert and oriented


not tremulous


track marks





a/p: Pt here for rehab after completing detox.

## 2019-08-08 NOTE — HP
CIWA Score





- Admission Criteria


OASAS Guidelines: Admission for Medically Managed Detox: 


Requires at least one of the followin. CIWA greater than 12


2. Seizures within the past 24 hours


3. Delirium tremens within the past 24 hours


4. Hallucinations within the past 24 hours


5. Acute intervention needed for co  occurring medical disorder


6. Acute intervention needed for co  occurring psychiatric disorder


7. Severe withdrawal that cannot be handled at a lower level of care (continued


    vomiting, continued diarrhea, abnormal vital signs) requiring intravenous


    medication and/or fluids


8. Pregnancy








Admission ROS [facility]





- [facility]


Chief Complaint: 


rehab from benzos, crack cocaine, heroin


Allergies/Adverse Reactions: 


 Allergies











Allergy/AdvReac Type Severity Reaction Status Date / Time


 


shellfish derived Allergy Severe Swelling Verified 19 15:10


 


No Known Drug Allergies Allergy   Verified 19 15:10











History of Present Illness: 


54 y/o M with PMH CAD s/p MI w/ CABG (; 2 stents), hep c (not tx), HTN, HLD

, anxiety, panic disorder, agoraphobia, who presents for rehab from benzos, 

heroin, and crack cocaine. Per pt, he was just at [facility] to complete his detox

, which ended today. Was sent here after for rehab.





Was using benzos (xanax) 3x a week, 2 mg at a time. Last use 7 days ago. Uses 

them because it makes him feel "relaxed." Has been using since age 27


Heroin last used 5 days ago; 2 bags via IVDA (RUE, b/l antecubital fossa), no 

hx endocarditis, cellulitis or abscesses. was abstinent for 10 yrs, but started 

using again 7 months ago. Is part of [facility] MTD program. On 120mg qd. Has been 

there for 2.5yrs. Heroin makes him "feel calm"


Smokes crack cocaine, $20/day since age 27





Was at [facility] rehab 2016, detox 2016 w other visits


his goal after rehab is to attend NA meetings, get his 's license back 

and get a part-time job at [employer] or [employer].





PMH: as above


PsxH: CABG as above


meds: singulair, lopressor, lisinopril, plavix, lipitor, asa, protonix, celexa


used to be on lasix but self d/c d/t continued urination


allergies: shellfish


FH: father - [place] age 43. mother - [place]; age 82


SH: rents a room from his sister. smokes 1/2 ppd x since age 19. drug use as 

above





- Ebola screening


Have you traveled outside of the country in the last 21 days: No


Have you had contact with anyone from an Ebola affected area: No


Have you been sick,other than usual withdrawal symptoms: No


Do you have a fever: No





- Review of Systems


Constitutional: No Symptoms Reported


EENT: reports: No Symptoms Reported


Respiratory: reports: No Symptoms reported


Cardiac: reports: No Symptoms Reported


GI: reports: No Symptoms Reported


: reports: No Symptoms Reported


Musculoskeletal: reports: No Symptoms Reported


Integumentary: reports: No Symptoms Reported


Neuro: reports: No Symptoms reported


Endocrine: reports: No Symptoms Reported


Hematology: reports: No Symptoms Reported


Psychiatric: reports: Orientated x3





Patient History





- Patient Medical History


Hx Anemia: No


Hx Asthma: No


Hx Chronic Obstructive Pulmonary Disease (COPD): No


Hx Cancer: No


Hx Cardiac Disorders: Yes (CABG, MI 2 stents)


Hx Congestive Heart Failure: No


Hx Hypertension: Yes


Hx Hypercholesterolemia: Yes (on med)


Hx Pacemaker: No


HX Cerebrovascular Accident: No


Hx Seizures: No


Hx Dementia: No


Hx Diabetes: No


Hx Gastrointestinal Disorders: No


Hx Liver Disease: No


Hx Genitourinary Disorders: No


Hx Sexually Transmitted Disorders: No


Hx Renal Disease (ESRD): No


Hx Thyroid Disease: No


Hx Human Immunodeficiency Virus (HIV): No (last 07/07/15 to 07/11/15 negative)


Hx Hepatitis C: Yes (not tx)


Hx Depression: No


Hx Suicide Attempt: No


Hx Bipolar Disorder: No


Hx Schizophrenia: No


Other Medical History: anxiety, panic disorder, agoraphobia





- Patient Surgical History


Past Surgical History: Yes


Hx Neurologic Surgery: No


Hx Cataract Extraction: No


Hx Cardiac Surgery: Yes ( ptca - 2 stents )


Hx Lung Surgery: No


Hx Breast Surgery: No


Hx Breast Biopsy: No


Hx Abdominal Surgery: No


Hx Appendectomy: No


Hx Cholecystectomy: No


Hx Genitourinary Surgery: No


Hx  Section: No


Hx Orthopedic Surgery: No


Anesthesia Reaction: No





- PPD History


Documented Results: Negative w/o proof


Date: 18


Results: negative


PPD to be Administered?: Yes





- Reproductive History


Patient is a Female of Child Bearing Age (11 -55 yrs old): No





- Smoking Cessation


Smoking history: Current every day smoker


Have you smoked in the past 12 months: Yes


Aproximately how many cigarettes per day: 20


Cigars Per Day: 0


Hx Chewing Tobacco Use: No


Initiated information on smoking cessation: Yes


'Breaking Loose' booklet given: 19





- Substance & Tx. History


Hx Alcohol Use: No


Substance Use Type: Cocaine, Heroin


Hx Substance Use Treatment: Yes ([facility] detox recent . [facility] previously 2016)





- Substances abused


  ** Heroin


Substance route: Injection


Frequency: Daily


Amount used: 2 bags


Age of first use: 19


Date of last use: 19





  ** Alcohol


Date of last use: 19





  ** Crack


Substance route: Injection


Frequency: Daily


Amount used: $20 dollars


Age of first use: 19


Date of last use: 19





  ** Alprazolam (Xanax)


Substance route: Oral


Frequency: 3-6 times per week


Amount used: 1 stick


Age of first use: 27


Date of last use: 19





Family Disease History





- Family Disease History


Family Disease History: Heart Disease: Father (s/p mi  at age 44), 

Mother (quadruple bypass ), Other: Sister (HIV)





Admission Physical Exam [facility]





- Vital Signs


Vital Signs: 


 Vital Signs - 24 hr











  19





  15:09


 


Temperature 98.4 F


 


Pulse Rate 55 L


 


Respiratory 18





Rate 


 


Blood Pressure 123/71














- Physical


General Appearance: Yes: Within Normal Limits


HEENTM: Yes: Within Normal Limits, Hearing grossly Normal


Respiratory: Yes: Lungs Clear


Neck: Yes: Supple


Breast: Yes: Breast Exam Deferred


Cardiology: Yes: Regular Rhythm, Regular Rate, S1, S2


Abdominal: Yes: Within Normal Limits, Flat, Soft


Genitourinary: Yes: Within Normal Limits


Back: Yes: Within Normal Limits


Musculoskeletal: Yes: full range of Motion


Extremities: Yes: Other (+track marks UE)


Neurological: Yes: CNs II-XII NML intact


Integumentary: Yes: Dry, Warm


Lymphatic: Yes: Within Normal Limits





- Diagnostic


(1) Cocaine use disorder


Current Visit: Yes   Status: Chronic   





(2) Opiate misuse


Current Visit: Yes   Status: Chronic   





(3) Moderate benzodiazepine use disorder


Current Visit: Yes   Status: Chronic   





(4) IVDU (intravenous drug user)


Current Visit: Yes   Status: Chronic   





(5) Agoraphobia


Current Visit: Yes   Status: Chronic   





(6) Anxiety disorder


Current Visit: Yes   Status: Chronic   





(7) Hepatitis C


Current Visit: Yes   Status: Chronic   


Qualifiers: 


   Viral hepatitis chronicity: unspecified   Hepatic coma status: without 

hepatic coma   Qualified Code(s): B19.20 - Unspecified viral hepatitis C 

without hepatic coma   





(8) Hyperlipidemia


Current Visit: Yes   Status: Chronic   





(9) Hypertension


Current Visit: Yes   Status: Chronic   


Qualifiers: 


   Hypertension type: essential hypertension   Qualified Code(s): I10 - 

Essential (primary) hypertension   





Cleared for Admission [facility]





- Detox or Rehab


Detox Regimen/Protocol: Not Applicable


Claeared for Rehab Admission: Yes





Breathalyzer





- Breathalyzer


Breathalyzer: 0





Urine Drug Screen





- Test Device


Lot number: [id number]


Expiration date: 21





- Control


Is test valid?: Yes





- Results


Drug screen NEGATIVE: No


Urine drug screen results: FEN-Fentanyl, MTD-Methadone, BZO-Benzodiazepines





Inpatient Rehab Admission





- Rehab Decision to Admit


Inpatient rehab admission?: Yes





- Initial Determination


Are CD services needed?: Yes


Free of communicable disease: Yes


Not in need of hospitalization: Yes





- Rehab Admission Criteria


Previous failed treatment: Yes


Poor recovery environment: Yes


Comorbidities: Yes


Lacks judgement: No


Patient is meeting Inpatient Rehab admission criteria:: Yes

## 2019-08-09 LAB
ALBUMIN SERPL-MCNC: 3.4 G/DL (ref 3.4–5)
ALP SERPL-CCNC: 84 U/L (ref 45–117)
ALT SERPL-CCNC: 45 U/L (ref 13–61)
ANION GAP SERPL CALC-SCNC: 7 MMOL/L (ref 8–16)
APPEARANCE UR: CLEAR
AST SERPL-CCNC: 31 U/L (ref 15–37)
BILIRUB SERPL-MCNC: 0.4 MG/DL (ref 0.2–1)
BILIRUB UR STRIP.AUTO-MCNC: NEGATIVE MG/DL
BUN SERPL-MCNC: 13.2 MG/DL (ref 7–18)
CALCIUM SERPL-MCNC: 9.1 MG/DL (ref 8.5–10.1)
CHLORIDE SERPL-SCNC: 103 MMOL/L (ref 98–107)
CO2 SERPL-SCNC: 29 MMOL/L (ref 21–32)
COLOR UR: YELLOW
CREAT SERPL-MCNC: 1 MG/DL (ref 0.55–1.3)
DEPRECATED RDW RBC AUTO: 14.9 % (ref 11.9–15.9)
GLUCOSE SERPL-MCNC: 198 MG/DL (ref 74–106)
HCT VFR BLD CALC: 42.6 % (ref 35.4–49)
HGB BLD-MCNC: 14 GM/DL (ref 11.7–16.9)
KETONES UR QL STRIP: NEGATIVE
LEUKOCYTE ESTERASE UR QL STRIP.AUTO: NEGATIVE
MCH RBC QN AUTO: 29 PG (ref 25.7–33.7)
MCHC RBC AUTO-ENTMCNC: 32.8 G/DL (ref 32–35.9)
MCV RBC: 88.3 FL (ref 80–96)
NITRITE UR QL STRIP: NEGATIVE
PH UR: 6 [PH] (ref 5–8)
PLATELET # BLD AUTO: 191 K/MM3 (ref 134–434)
PMV BLD: 9.9 FL (ref 7.5–11.1)
POTASSIUM SERPLBLD-SCNC: 4.4 MMOL/L (ref 3.5–5.1)
PROT SERPL-MCNC: 7.8 G/DL (ref 6.4–8.2)
PROT UR QL STRIP: NEGATIVE
PROT UR QL STRIP: NEGATIVE
RBC # BLD AUTO: 4.83 M/MM3 (ref 4–5.6)
SODIUM SERPL-SCNC: 140 MMOL/L (ref 136–145)
SP GR UR: 1.02 (ref 1.01–1.03)
UROBILINOGEN UR STRIP-MCNC: 0.2 MG/DL (ref 0.2–1)
WBC # BLD AUTO: 7.3 K/MM3 (ref 4–10)

## 2019-08-09 RX ADMIN — Medication SCH MG: at 21:40

## 2019-08-09 RX ADMIN — CLOPIDOGREL BISULFATE SCH MG: 75 TABLET, FILM COATED ORAL at 07:01

## 2019-08-09 RX ADMIN — Medication SCH TAB: at 10:27

## 2019-08-09 RX ADMIN — METOPROLOL TARTRATE SCH MG: 25 TABLET, FILM COATED ORAL at 22:10

## 2019-08-09 RX ADMIN — RANITIDINE SCH MG: 150 TABLET ORAL at 11:03

## 2019-08-09 RX ADMIN — ASPIRIN 81 MG SCH MG: 81 TABLET ORAL at 10:27

## 2019-08-09 RX ADMIN — NICOTINE SCH MG: 14 PATCH, EXTENDED RELEASE TRANSDERMAL at 10:28

## 2019-08-09 RX ADMIN — METHADONE HYDROCHLORIDE SCH MG: 40 TABLET ORAL at 10:28

## 2019-08-09 RX ADMIN — ATORVASTATIN CALCIUM SCH MG: 40 TABLET, FILM COATED ORAL at 21:40

## 2019-08-09 RX ADMIN — LISINOPRIL SCH MG: 5 TABLET ORAL at 10:28

## 2019-08-09 RX ADMIN — METOPROLOL TARTRATE SCH MG: 25 TABLET, FILM COATED ORAL at 11:03

## 2019-08-09 RX ADMIN — MONTELUKAST SODIUM SCH MG: 10 TABLET, COATED ORAL at 21:40

## 2019-08-09 RX ADMIN — RANITIDINE SCH MG: 150 TABLET ORAL at 21:40

## 2019-08-10 RX ADMIN — METHADONE HYDROCHLORIDE SCH MG: 40 TABLET ORAL at 05:55

## 2019-08-10 RX ADMIN — Medication SCH MG: at 21:29

## 2019-08-10 RX ADMIN — RANITIDINE SCH MG: 150 TABLET ORAL at 10:23

## 2019-08-10 RX ADMIN — ATORVASTATIN CALCIUM SCH MG: 40 TABLET, FILM COATED ORAL at 21:29

## 2019-08-10 RX ADMIN — NICOTINE SCH: 14 PATCH, EXTENDED RELEASE TRANSDERMAL at 10:24

## 2019-08-10 RX ADMIN — Medication SCH TAB: at 10:23

## 2019-08-10 RX ADMIN — LISINOPRIL SCH MG: 5 TABLET ORAL at 10:23

## 2019-08-10 RX ADMIN — RANITIDINE SCH MG: 150 TABLET ORAL at 21:29

## 2019-08-10 RX ADMIN — METOPROLOL TARTRATE SCH MG: 25 TABLET, FILM COATED ORAL at 10:23

## 2019-08-10 RX ADMIN — MONTELUKAST SODIUM SCH: 10 TABLET, COATED ORAL at 21:29

## 2019-08-10 RX ADMIN — ASPIRIN 81 MG SCH MG: 81 TABLET ORAL at 10:23

## 2019-08-10 RX ADMIN — METOPROLOL TARTRATE SCH MG: 25 TABLET, FILM COATED ORAL at 21:29

## 2019-08-10 RX ADMIN — CLOPIDOGREL BISULFATE SCH MG: 75 TABLET, FILM COATED ORAL at 07:48

## 2019-08-11 RX ADMIN — Medication SCH MG: at 21:35

## 2019-08-11 RX ADMIN — LISINOPRIL SCH MG: 5 TABLET ORAL at 09:44

## 2019-08-11 RX ADMIN — ATORVASTATIN CALCIUM SCH MG: 40 TABLET, FILM COATED ORAL at 21:35

## 2019-08-11 RX ADMIN — METOPROLOL TARTRATE SCH MG: 25 TABLET, FILM COATED ORAL at 21:35

## 2019-08-11 RX ADMIN — METHADONE HYDROCHLORIDE SCH MG: 40 TABLET ORAL at 06:06

## 2019-08-11 RX ADMIN — NICOTINE SCH: 14 PATCH, EXTENDED RELEASE TRANSDERMAL at 09:44

## 2019-08-11 RX ADMIN — ASPIRIN 81 MG SCH MG: 81 TABLET ORAL at 09:44

## 2019-08-11 RX ADMIN — METOPROLOL TARTRATE SCH MG: 25 TABLET, FILM COATED ORAL at 09:45

## 2019-08-11 RX ADMIN — Medication PRN MG: at 21:36

## 2019-08-11 RX ADMIN — CLOPIDOGREL BISULFATE SCH MG: 75 TABLET, FILM COATED ORAL at 07:46

## 2019-08-11 RX ADMIN — RANITIDINE SCH MG: 150 TABLET ORAL at 21:35

## 2019-08-11 RX ADMIN — MONTELUKAST SODIUM SCH: 10 TABLET, COATED ORAL at 21:35

## 2019-08-11 RX ADMIN — Medication SCH TAB: at 09:45

## 2019-08-11 RX ADMIN — RANITIDINE SCH MG: 150 TABLET ORAL at 09:44

## 2019-08-12 RX ADMIN — MONTELUKAST SODIUM SCH: 10 TABLET, COATED ORAL at 21:27

## 2019-08-12 RX ADMIN — ASPIRIN 81 MG SCH MG: 81 TABLET ORAL at 10:25

## 2019-08-12 RX ADMIN — NICOTINE SCH: 14 PATCH, EXTENDED RELEASE TRANSDERMAL at 10:25

## 2019-08-12 RX ADMIN — METOPROLOL TARTRATE SCH MG: 25 TABLET, FILM COATED ORAL at 21:26

## 2019-08-12 RX ADMIN — ATORVASTATIN CALCIUM SCH MG: 40 TABLET, FILM COATED ORAL at 21:26

## 2019-08-12 RX ADMIN — RANITIDINE SCH MG: 150 TABLET ORAL at 21:25

## 2019-08-12 RX ADMIN — Medication SCH MG: at 21:27

## 2019-08-12 RX ADMIN — METOPROLOL TARTRATE SCH: 25 TABLET, FILM COATED ORAL at 10:25

## 2019-08-12 RX ADMIN — RANITIDINE SCH MG: 150 TABLET ORAL at 10:25

## 2019-08-12 RX ADMIN — CLOPIDOGREL BISULFATE SCH MG: 75 TABLET, FILM COATED ORAL at 08:23

## 2019-08-12 RX ADMIN — LISINOPRIL SCH MG: 5 TABLET ORAL at 10:25

## 2019-08-12 RX ADMIN — METHADONE HYDROCHLORIDE SCH MG: 40 TABLET ORAL at 05:57

## 2019-08-12 RX ADMIN — Medication SCH TAB: at 10:25

## 2019-08-12 RX ADMIN — Medication PRN MG: at 21:27

## 2019-08-12 NOTE — PN
[facility] Progress Note


Note: 





Psychiatry


Attending's note :


Abnormal EKG.


Sinus bradycardia.


Writer contacted LICHA Stuart


Via telephone [phone]). 


 For follow-up.

## 2019-08-12 NOTE — CONSULT
[facility] Psychiatric Consult





- Data


Date of interview: 08/12/19


Admission source: Referral from [facility].


Identifying data: Day 5 of rehabilitation for this 52y/o  male, 

directly admitted to [address] after completion of detoxification at 

[facility]. Patient is , a father of one, domiciled, unemployed and 

supported on SSI benefits.


Substance Abuse History: Smoking history: Current every day smoker.  Have you 

smoked in the past 12 months: Yes.  Aproximately how many cigarettes per day: 

20.  Cigars Per Day: 0.  Hx Chewing Tobacco Use: No.  Initiated information on 

smoking cessation: Yes.  'Breaking Loose' booklet given: 08/08/19.  - Substance 

& Tx. History.  Hx Alcohol Use: No.  Substance Use Type: Cocaine, Heroin.  Hx 

Substance Use Treatment: Yes ([facility] detox recent . [facility] previously 2016).  - 

Substances abused.  ** Heroin.  Substance route: Injection.  Frequency: Daily.  

Amount used: 2 bags.  Age of first use: 19.  Date of last use: 08/03/19.  ** 

Alcohol.  Date of last use: 08/03/19.  ** Crack.  Substance route: smoking.  

Frequency: Daily.  Amount used: $20 dollars.  Age of first use: 19.  Date of 

last use: 08/03/19.  ** Alprazolam (Xanax).  Substance route: Oral.  Frequency: 

3-6 times per week.  Amount used: 1 stick.  Age of first use: 27.  Date of last 

use: 08/01/19


Medical History: Medical profile is remarkable for a history of myocardial 

infarction in 2011 (two stents), hypertension, dyslipidemia, coronary artery 

disease and hepatitis C.


Psychiatric History: No reported history of psychiatric hospitalizations.He 

continues to address his issues (substance use disorders, MDD, Panic Disorder) 

at the [facility] OPD clinic (Amie/Tian).Medicated with celexa 20 mg/day + 

klonopin 1 mg/bid (Dr Peguero).Mr Schrader is currently on methadone 

maintenance (120 mg/day) at [facility]. Patient denies history of suicide attempts.


Physical/Sexual Abuse/Trauma History: Patient denies history of abuse.


Additional Comment: Urine drug screen results: FEN-Fentanyl, MTD-Methadone, BZO-

Benzodiazepines. Noted.





Mental Status Exam





- Mental Status Exam


Alert and Oriented to: Time, Place, Person


Cognitive Function: Good


Patient Appearance: Well Groomed


Mood: Hopeful, Euthymic


Affect: Appropriate, Normal Range


Patient Behavior: Appropriate, Cooperative


Speech Pattern: Clear, Appropriate


Voice Loudness: Normal


Thought Process: Intact, Goal Oriented


Thought Disorder: Not Present


Hallucinations: Denies


Suicidal Ideation: Denies


Homicidal Ideation: Denies


Insight/Judgement: Fair


Sleep: Poorly, Difficulty falling asleep


Appetite: Good


Muscle strength/Tone: Normal


Gait/Station: Normal





Psychiatric Findings





- Problem List (Axis 1, 2,3)


(1) Opioid dependence on agonist therapy


Current Visit: Yes   Status: Chronic   





(2) Moderate benzodiazepine use disorder


Current Visit: Yes   Status: Chronic   





(3) Cocaine dependence


Current Visit: Yes   Status: Chronic   





(4) Nicotine dependence


Current Visit: Yes   Status: Chronic   


Qualifiers: 


   Nicotine product type: cigarettes   Substance use status: in withdrawal   

Qualified Code(s): F17.213 - Nicotine dependence, cigarettes, with withdrawal   





(5) Depressive disorder


Current Visit: Yes   Status: Chronic   





(6) History of panic disorder


Current Visit: Yes   Status: Chronic   





(7) Insomnia


Current Visit: Yes   Status: Chronic   





- Initial Treatment Plan


Initial Treatment Plan: Psychoeducation. Sleep hygiene. NA meetings. 

Counseling. Medications : celexa 20 mg po daily. Side effects/benefits are 

discussed with patient. Gave consent (verbal) to MD. Observation.

## 2019-08-13 RX ADMIN — MONTELUKAST SODIUM SCH: 10 TABLET, COATED ORAL at 21:54

## 2019-08-13 RX ADMIN — ASPIRIN 81 MG SCH MG: 81 TABLET ORAL at 10:17

## 2019-08-13 RX ADMIN — CLOPIDOGREL BISULFATE SCH MG: 75 TABLET, FILM COATED ORAL at 07:51

## 2019-08-13 RX ADMIN — ACETAMINOPHEN PRN MG: 325 TABLET ORAL at 07:47

## 2019-08-13 RX ADMIN — Medication PRN MG: at 21:56

## 2019-08-13 RX ADMIN — LISINOPRIL SCH MG: 5 TABLET ORAL at 10:17

## 2019-08-13 RX ADMIN — METHADONE HYDROCHLORIDE SCH MG: 40 TABLET ORAL at 05:56

## 2019-08-13 RX ADMIN — RANITIDINE SCH MG: 150 TABLET ORAL at 21:53

## 2019-08-13 RX ADMIN — Medication SCH MG: at 21:54

## 2019-08-13 RX ADMIN — RANITIDINE SCH MG: 150 TABLET ORAL at 10:17

## 2019-08-13 RX ADMIN — Medication SCH TAB: at 10:17

## 2019-08-13 RX ADMIN — ATORVASTATIN CALCIUM SCH MG: 40 TABLET, FILM COATED ORAL at 21:55

## 2019-08-13 RX ADMIN — METOPROLOL TARTRATE SCH MG: 25 TABLET, FILM COATED ORAL at 10:16

## 2019-08-13 RX ADMIN — METOPROLOL TARTRATE SCH MG: 25 TABLET, FILM COATED ORAL at 21:56

## 2019-08-13 RX ADMIN — NICOTINE SCH: 14 PATCH, EXTENDED RELEASE TRANSDERMAL at 10:17

## 2019-08-14 RX ADMIN — METHADONE HYDROCHLORIDE SCH MG: 40 TABLET ORAL at 05:50

## 2019-08-14 RX ADMIN — NICOTINE SCH: 14 PATCH, EXTENDED RELEASE TRANSDERMAL at 10:01

## 2019-08-14 RX ADMIN — ASPIRIN 81 MG SCH MG: 81 TABLET ORAL at 10:01

## 2019-08-14 RX ADMIN — Medication SCH MG: at 21:46

## 2019-08-14 RX ADMIN — Medication SCH TAB: at 10:01

## 2019-08-14 RX ADMIN — MONTELUKAST SODIUM SCH: 10 TABLET, COATED ORAL at 21:47

## 2019-08-14 RX ADMIN — CLOPIDOGREL BISULFATE SCH MG: 75 TABLET, FILM COATED ORAL at 07:23

## 2019-08-14 RX ADMIN — METOPROLOL TARTRATE SCH MG: 25 TABLET, FILM COATED ORAL at 21:46

## 2019-08-14 RX ADMIN — METOPROLOL TARTRATE SCH MG: 25 TABLET, FILM COATED ORAL at 10:01

## 2019-08-14 RX ADMIN — ATORVASTATIN CALCIUM SCH MG: 40 TABLET, FILM COATED ORAL at 21:46

## 2019-08-14 RX ADMIN — RANITIDINE SCH MG: 150 TABLET ORAL at 10:01

## 2019-08-14 RX ADMIN — LISINOPRIL SCH MG: 5 TABLET ORAL at 10:01

## 2019-08-14 RX ADMIN — RANITIDINE SCH MG: 150 TABLET ORAL at 21:46

## 2019-08-14 NOTE — PN
[facility] Progress Note


Note: 





EKG done 8/12 shows sinus liz- rate 51, non specific ST abnormality. Pt has h/

o MI with stent placement. no chest pain at the present time

## 2019-08-14 NOTE — PN
[facility] Progress Note


Note: 





Psychiatry


Attending's note (follow-up) :


Case discussed, via telephone, with Dr Hahn.


EKG reviewed. Dr Hahn's note : appreciated. 


Will resume citalopram 20 mg po daily at patient's request.


Mr Schrader is already known to this writer. 


No history of adverse effects from citalopram.


Records yield no evidence of past treatment with seroquel.


Patient was reportedly using " old, left over pills " at home. 


Not prescribed by his outpatient physician.

## 2019-08-14 NOTE — EKG
Test Reason : 

Blood Pressure : ***/*** mmHG

Vent. Rate : 051 BPM     Atrial Rate : 051 BPM

   P-R Int : 142 ms          QRS Dur : 096 ms

    QT Int : 470 ms       P-R-T Axes : 039 007 082 degrees

   QTc Int : 433 ms

 

SINUS BRADYCARDIA

NONSPECIFIC ST ABNORMALITY

ABNORMAL ECG

NO PREVIOUS ECGS AVAILABLE

Confirmed by VESNA FALK, PASQUALE ([id number]) on 8/14/2019 11:48:12 AM

 

Referred By:             Confirmed By:PASQUALE MALAGON MD

## 2019-08-15 RX ADMIN — METOPROLOL TARTRATE SCH MG: 25 TABLET, FILM COATED ORAL at 09:36

## 2019-08-15 RX ADMIN — RANITIDINE SCH MG: 150 TABLET ORAL at 09:36

## 2019-08-15 RX ADMIN — LISINOPRIL SCH MG: 5 TABLET ORAL at 09:36

## 2019-08-15 RX ADMIN — Medication SCH TAB: at 09:36

## 2019-08-15 RX ADMIN — METOPROLOL TARTRATE SCH MG: 25 TABLET, FILM COATED ORAL at 21:23

## 2019-08-15 RX ADMIN — CITALOPRAM HYDROBROMIDE SCH MG: 20 TABLET ORAL at 09:37

## 2019-08-15 RX ADMIN — CLOPIDOGREL BISULFATE SCH MG: 75 TABLET, FILM COATED ORAL at 07:10

## 2019-08-15 RX ADMIN — MONTELUKAST SODIUM SCH: 10 TABLET, COATED ORAL at 21:24

## 2019-08-15 RX ADMIN — RANITIDINE SCH MG: 150 TABLET ORAL at 21:23

## 2019-08-15 RX ADMIN — METHADONE HYDROCHLORIDE SCH MG: 40 TABLET ORAL at 06:27

## 2019-08-15 RX ADMIN — NICOTINE SCH: 14 PATCH, EXTENDED RELEASE TRANSDERMAL at 09:38

## 2019-08-15 RX ADMIN — ASPIRIN 81 MG SCH MG: 81 TABLET ORAL at 09:36

## 2019-08-15 RX ADMIN — ATORVASTATIN CALCIUM SCH MG: 40 TABLET, FILM COATED ORAL at 21:23

## 2019-08-15 RX ADMIN — Medication SCH MG: at 21:23

## 2019-08-16 RX ADMIN — METOPROLOL TARTRATE SCH MG: 25 TABLET, FILM COATED ORAL at 10:19

## 2019-08-16 RX ADMIN — Medication SCH MG: at 21:21

## 2019-08-16 RX ADMIN — RANITIDINE SCH MG: 150 TABLET ORAL at 10:19

## 2019-08-16 RX ADMIN — METOPROLOL TARTRATE SCH: 25 TABLET, FILM COATED ORAL at 21:55

## 2019-08-16 RX ADMIN — METHADONE HYDROCHLORIDE SCH MG: 40 TABLET ORAL at 05:44

## 2019-08-16 RX ADMIN — Medication SCH TAB: at 10:19

## 2019-08-16 RX ADMIN — NICOTINE SCH: 14 PATCH, EXTENDED RELEASE TRANSDERMAL at 10:20

## 2019-08-16 RX ADMIN — MONTELUKAST SODIUM SCH: 10 TABLET, COATED ORAL at 21:48

## 2019-08-16 RX ADMIN — CITALOPRAM HYDROBROMIDE SCH MG: 20 TABLET ORAL at 10:19

## 2019-08-16 RX ADMIN — RANITIDINE SCH MG: 150 TABLET ORAL at 21:21

## 2019-08-16 RX ADMIN — Medication PRN MG: at 21:21

## 2019-08-16 RX ADMIN — ASPIRIN 81 MG SCH MG: 81 TABLET ORAL at 10:19

## 2019-08-16 RX ADMIN — ATORVASTATIN CALCIUM SCH MG: 40 TABLET, FILM COATED ORAL at 21:21

## 2019-08-16 RX ADMIN — CLOPIDOGREL BISULFATE SCH MG: 75 TABLET, FILM COATED ORAL at 07:26

## 2019-08-16 RX ADMIN — LISINOPRIL SCH MG: 5 TABLET ORAL at 10:19

## 2019-08-17 RX ADMIN — NICOTINE SCH: 14 PATCH, EXTENDED RELEASE TRANSDERMAL at 09:52

## 2019-08-17 RX ADMIN — METHADONE HYDROCHLORIDE SCH MG: 40 TABLET ORAL at 06:03

## 2019-08-17 RX ADMIN — Medication SCH MG: at 22:00

## 2019-08-17 RX ADMIN — CLOPIDOGREL BISULFATE SCH MG: 75 TABLET, FILM COATED ORAL at 07:31

## 2019-08-17 RX ADMIN — METOPROLOL TARTRATE SCH MG: 25 TABLET, FILM COATED ORAL at 21:16

## 2019-08-17 RX ADMIN — RANITIDINE SCH MG: 150 TABLET ORAL at 21:15

## 2019-08-17 RX ADMIN — MONTELUKAST SODIUM SCH: 10 TABLET, COATED ORAL at 21:16

## 2019-08-17 RX ADMIN — ASPIRIN 81 MG SCH MG: 81 TABLET ORAL at 09:52

## 2019-08-17 RX ADMIN — Medication PRN MG: at 21:17

## 2019-08-17 RX ADMIN — METOPROLOL TARTRATE SCH MG: 25 TABLET, FILM COATED ORAL at 09:52

## 2019-08-17 RX ADMIN — Medication SCH TAB: at 09:52

## 2019-08-17 RX ADMIN — LISINOPRIL SCH MG: 5 TABLET ORAL at 09:52

## 2019-08-17 RX ADMIN — CITALOPRAM HYDROBROMIDE SCH MG: 20 TABLET ORAL at 09:52

## 2019-08-17 RX ADMIN — ATORVASTATIN CALCIUM SCH MG: 40 TABLET, FILM COATED ORAL at 21:16

## 2019-08-17 RX ADMIN — RANITIDINE SCH MG: 150 TABLET ORAL at 09:52

## 2019-08-18 RX ADMIN — Medication SCH TAB: at 10:01

## 2019-08-18 RX ADMIN — CLOPIDOGREL BISULFATE SCH MG: 75 TABLET, FILM COATED ORAL at 07:19

## 2019-08-18 RX ADMIN — RANITIDINE SCH MG: 150 TABLET ORAL at 21:52

## 2019-08-18 RX ADMIN — ATORVASTATIN CALCIUM SCH MG: 40 TABLET, FILM COATED ORAL at 21:52

## 2019-08-18 RX ADMIN — METOPROLOL TARTRATE SCH MG: 25 TABLET, FILM COATED ORAL at 21:52

## 2019-08-18 RX ADMIN — Medication PRN MG: at 21:52

## 2019-08-18 RX ADMIN — NICOTINE SCH: 14 PATCH, EXTENDED RELEASE TRANSDERMAL at 10:02

## 2019-08-18 RX ADMIN — CITALOPRAM HYDROBROMIDE SCH MG: 20 TABLET ORAL at 10:01

## 2019-08-18 RX ADMIN — METHADONE HYDROCHLORIDE SCH MG: 40 TABLET ORAL at 05:49

## 2019-08-18 RX ADMIN — MONTELUKAST SODIUM SCH: 10 TABLET, COATED ORAL at 21:53

## 2019-08-18 RX ADMIN — LISINOPRIL SCH MG: 5 TABLET ORAL at 10:01

## 2019-08-18 RX ADMIN — Medication SCH: at 21:53

## 2019-08-18 RX ADMIN — RANITIDINE SCH MG: 150 TABLET ORAL at 10:01

## 2019-08-18 RX ADMIN — METOPROLOL TARTRATE SCH MG: 25 TABLET, FILM COATED ORAL at 10:01

## 2019-08-18 RX ADMIN — ASPIRIN 81 MG SCH MG: 81 TABLET ORAL at 10:01

## 2019-08-19 RX ADMIN — METOPROLOL TARTRATE SCH: 25 TABLET, FILM COATED ORAL at 21:47

## 2019-08-19 RX ADMIN — Medication SCH: at 21:47

## 2019-08-19 RX ADMIN — CITALOPRAM HYDROBROMIDE SCH MG: 20 TABLET ORAL at 10:10

## 2019-08-19 RX ADMIN — CLOPIDOGREL BISULFATE SCH: 75 TABLET, FILM COATED ORAL at 07:10

## 2019-08-19 RX ADMIN — METOPROLOL TARTRATE SCH MG: 25 TABLET, FILM COATED ORAL at 10:09

## 2019-08-19 RX ADMIN — NICOTINE SCH: 14 PATCH, EXTENDED RELEASE TRANSDERMAL at 10:11

## 2019-08-19 RX ADMIN — Medication SCH: at 10:11

## 2019-08-19 RX ADMIN — RANITIDINE SCH MG: 150 TABLET ORAL at 10:10

## 2019-08-19 RX ADMIN — ASPIRIN 81 MG SCH MG: 81 TABLET ORAL at 10:09

## 2019-08-19 RX ADMIN — RANITIDINE SCH: 150 TABLET ORAL at 21:47

## 2019-08-19 RX ADMIN — MONTELUKAST SODIUM SCH: 10 TABLET, COATED ORAL at 21:47

## 2019-08-19 RX ADMIN — METHADONE HYDROCHLORIDE SCH MG: 40 TABLET ORAL at 06:11

## 2019-08-19 RX ADMIN — ATORVASTATIN CALCIUM SCH: 40 TABLET, FILM COATED ORAL at 21:47

## 2019-08-19 RX ADMIN — LISINOPRIL SCH MG: 5 TABLET ORAL at 10:10

## 2019-08-20 RX ADMIN — METHADONE HYDROCHLORIDE SCH MG: 40 TABLET ORAL at 05:48

## 2019-08-20 RX ADMIN — MONTELUKAST SODIUM SCH: 10 TABLET, COATED ORAL at 21:49

## 2019-08-20 RX ADMIN — Medication SCH: at 21:49

## 2019-08-20 RX ADMIN — ATORVASTATIN CALCIUM SCH MG: 40 TABLET, FILM COATED ORAL at 21:50

## 2019-08-20 RX ADMIN — CLOPIDOGREL BISULFATE SCH MG: 75 TABLET, FILM COATED ORAL at 08:01

## 2019-08-20 RX ADMIN — CITALOPRAM HYDROBROMIDE SCH: 20 TABLET ORAL at 13:04

## 2019-08-20 RX ADMIN — RANITIDINE SCH MG: 150 TABLET ORAL at 21:49

## 2019-08-20 RX ADMIN — Medication SCH: at 13:04

## 2019-08-20 RX ADMIN — RANITIDINE SCH: 150 TABLET ORAL at 13:04

## 2019-08-20 RX ADMIN — METOPROLOL TARTRATE SCH MG: 25 TABLET, FILM COATED ORAL at 21:49

## 2019-08-20 RX ADMIN — ASPIRIN 81 MG SCH: 81 TABLET ORAL at 13:04

## 2019-08-20 RX ADMIN — NICOTINE SCH: 14 PATCH, EXTENDED RELEASE TRANSDERMAL at 13:04

## 2019-08-20 RX ADMIN — METOPROLOL TARTRATE SCH: 25 TABLET, FILM COATED ORAL at 13:04

## 2019-08-20 RX ADMIN — LISINOPRIL SCH: 5 TABLET ORAL at 13:04

## 2019-08-21 RX ADMIN — RANITIDINE SCH MG: 150 TABLET ORAL at 09:48

## 2019-08-21 RX ADMIN — LISINOPRIL SCH MG: 5 TABLET ORAL at 09:47

## 2019-08-21 RX ADMIN — ASPIRIN 81 MG SCH MG: 81 TABLET ORAL at 09:47

## 2019-08-21 RX ADMIN — NICOTINE SCH: 14 PATCH, EXTENDED RELEASE TRANSDERMAL at 09:48

## 2019-08-21 RX ADMIN — CITALOPRAM HYDROBROMIDE SCH MG: 20 TABLET ORAL at 09:48

## 2019-08-21 RX ADMIN — METOPROLOL TARTRATE SCH MG: 25 TABLET, FILM COATED ORAL at 09:48

## 2019-08-21 RX ADMIN — Medication SCH TAB: at 09:47

## 2019-08-21 RX ADMIN — METOPROLOL TARTRATE SCH MG: 25 TABLET, FILM COATED ORAL at 22:19

## 2019-08-21 RX ADMIN — Medication SCH: at 22:19

## 2019-08-21 RX ADMIN — ATORVASTATIN CALCIUM SCH MG: 40 TABLET, FILM COATED ORAL at 22:19

## 2019-08-21 RX ADMIN — CLOPIDOGREL BISULFATE SCH MG: 75 TABLET, FILM COATED ORAL at 07:06

## 2019-08-21 RX ADMIN — RANITIDINE SCH MG: 150 TABLET ORAL at 22:19

## 2019-08-21 RX ADMIN — METHADONE HYDROCHLORIDE SCH MG: 40 TABLET ORAL at 06:08

## 2019-08-21 RX ADMIN — MONTELUKAST SODIUM SCH: 10 TABLET, COATED ORAL at 22:19

## 2019-08-22 RX ADMIN — RANITIDINE SCH MG: 150 TABLET ORAL at 21:37

## 2019-08-22 RX ADMIN — RANITIDINE SCH MG: 150 TABLET ORAL at 10:09

## 2019-08-22 RX ADMIN — CLOPIDOGREL BISULFATE SCH MG: 75 TABLET, FILM COATED ORAL at 07:33

## 2019-08-22 RX ADMIN — Medication SCH MG: at 21:36

## 2019-08-22 RX ADMIN — MONTELUKAST SODIUM SCH: 10 TABLET, COATED ORAL at 21:37

## 2019-08-22 RX ADMIN — METHADONE HYDROCHLORIDE SCH MG: 40 TABLET ORAL at 06:24

## 2019-08-22 RX ADMIN — NICOTINE SCH: 14 PATCH, EXTENDED RELEASE TRANSDERMAL at 10:09

## 2019-08-22 RX ADMIN — ASPIRIN 81 MG SCH MG: 81 TABLET ORAL at 10:08

## 2019-08-22 RX ADMIN — Medication SCH: at 10:09

## 2019-08-22 RX ADMIN — LISINOPRIL SCH MG: 5 TABLET ORAL at 10:08

## 2019-08-22 RX ADMIN — CITALOPRAM HYDROBROMIDE SCH MG: 20 TABLET ORAL at 10:08

## 2019-08-22 RX ADMIN — METOPROLOL TARTRATE SCH MG: 25 TABLET, FILM COATED ORAL at 10:08

## 2019-08-22 RX ADMIN — ATORVASTATIN CALCIUM SCH MG: 40 TABLET, FILM COATED ORAL at 21:37

## 2019-08-22 RX ADMIN — METOPROLOL TARTRATE SCH: 25 TABLET, FILM COATED ORAL at 21:37

## 2019-08-23 RX ADMIN — Medication SCH TAB: at 10:12

## 2019-08-23 RX ADMIN — RANITIDINE SCH MG: 150 TABLET ORAL at 21:18

## 2019-08-23 RX ADMIN — CITALOPRAM HYDROBROMIDE SCH MG: 20 TABLET ORAL at 10:11

## 2019-08-23 RX ADMIN — METOPROLOL TARTRATE SCH: 25 TABLET, FILM COATED ORAL at 21:19

## 2019-08-23 RX ADMIN — MONTELUKAST SODIUM SCH: 10 TABLET, COATED ORAL at 21:19

## 2019-08-23 RX ADMIN — CLOPIDOGREL BISULFATE SCH MG: 75 TABLET, FILM COATED ORAL at 07:01

## 2019-08-23 RX ADMIN — LISINOPRIL SCH MG: 5 TABLET ORAL at 10:12

## 2019-08-23 RX ADMIN — ASPIRIN 81 MG SCH MG: 81 TABLET ORAL at 10:11

## 2019-08-23 RX ADMIN — RANITIDINE SCH MG: 150 TABLET ORAL at 10:12

## 2019-08-23 RX ADMIN — NICOTINE SCH: 14 PATCH, EXTENDED RELEASE TRANSDERMAL at 10:12

## 2019-08-23 RX ADMIN — METOPROLOL TARTRATE SCH MG: 25 TABLET, FILM COATED ORAL at 10:11

## 2019-08-23 RX ADMIN — Medication SCH MG: at 21:17

## 2019-08-23 RX ADMIN — ACETAMINOPHEN PRN MG: 325 TABLET ORAL at 07:45

## 2019-08-23 RX ADMIN — ATORVASTATIN CALCIUM SCH MG: 40 TABLET, FILM COATED ORAL at 21:18

## 2019-08-23 RX ADMIN — Medication PRN MG: at 21:18

## 2019-08-23 RX ADMIN — METHADONE HYDROCHLORIDE SCH MG: 40 TABLET ORAL at 06:40

## 2019-08-24 RX ADMIN — MONTELUKAST SODIUM SCH: 10 TABLET, COATED ORAL at 21:59

## 2019-08-24 RX ADMIN — NICOTINE SCH: 14 PATCH, EXTENDED RELEASE TRANSDERMAL at 11:09

## 2019-08-24 RX ADMIN — CITALOPRAM HYDROBROMIDE SCH MG: 20 TABLET ORAL at 11:09

## 2019-08-24 RX ADMIN — Medication SCH TAB: at 11:09

## 2019-08-24 RX ADMIN — Medication PRN MG: at 21:59

## 2019-08-24 RX ADMIN — ATORVASTATIN CALCIUM SCH MG: 40 TABLET, FILM COATED ORAL at 21:58

## 2019-08-24 RX ADMIN — METHADONE HYDROCHLORIDE SCH MG: 40 TABLET ORAL at 06:26

## 2019-08-24 RX ADMIN — RANITIDINE SCH MG: 150 TABLET ORAL at 21:58

## 2019-08-24 RX ADMIN — METOPROLOL TARTRATE SCH: 25 TABLET, FILM COATED ORAL at 21:59

## 2019-08-24 RX ADMIN — LISINOPRIL SCH MG: 5 TABLET ORAL at 11:09

## 2019-08-24 RX ADMIN — Medication SCH MG: at 21:58

## 2019-08-24 RX ADMIN — CLOPIDOGREL BISULFATE SCH MG: 75 TABLET, FILM COATED ORAL at 07:04

## 2019-08-24 RX ADMIN — ASPIRIN 81 MG SCH MG: 81 TABLET ORAL at 11:09

## 2019-08-24 RX ADMIN — RANITIDINE SCH MG: 150 TABLET ORAL at 11:09

## 2019-08-24 RX ADMIN — METOPROLOL TARTRATE SCH MG: 25 TABLET, FILM COATED ORAL at 11:10

## 2019-08-25 RX ADMIN — ASPIRIN 81 MG SCH MG: 81 TABLET ORAL at 09:13

## 2019-08-25 RX ADMIN — METOPROLOL TARTRATE SCH: 25 TABLET, FILM COATED ORAL at 09:14

## 2019-08-25 RX ADMIN — CLOPIDOGREL BISULFATE SCH MG: 75 TABLET, FILM COATED ORAL at 07:33

## 2019-08-25 RX ADMIN — Medication SCH TAB: at 09:14

## 2019-08-25 RX ADMIN — ATORVASTATIN CALCIUM SCH MG: 40 TABLET, FILM COATED ORAL at 22:20

## 2019-08-25 RX ADMIN — METOPROLOL TARTRATE SCH MG: 25 TABLET, FILM COATED ORAL at 22:20

## 2019-08-25 RX ADMIN — Medication SCH MG: at 22:21

## 2019-08-25 RX ADMIN — CITALOPRAM HYDROBROMIDE SCH MG: 20 TABLET ORAL at 09:13

## 2019-08-25 RX ADMIN — NICOTINE SCH: 14 PATCH, EXTENDED RELEASE TRANSDERMAL at 09:14

## 2019-08-25 RX ADMIN — MONTELUKAST SODIUM SCH: 10 TABLET, COATED ORAL at 22:21

## 2019-08-25 RX ADMIN — RANITIDINE SCH MG: 150 TABLET ORAL at 09:15

## 2019-08-25 RX ADMIN — LISINOPRIL SCH MG: 5 TABLET ORAL at 09:15

## 2019-08-25 RX ADMIN — RANITIDINE SCH MG: 150 TABLET ORAL at 22:22

## 2019-08-25 RX ADMIN — METHADONE HYDROCHLORIDE SCH MG: 40 TABLET ORAL at 06:39

## 2019-08-26 RX ADMIN — Medication PRN MG: at 21:42

## 2019-08-26 RX ADMIN — RANITIDINE SCH MG: 150 TABLET ORAL at 21:41

## 2019-08-26 RX ADMIN — Medication SCH MG: at 21:41

## 2019-08-26 RX ADMIN — MONTELUKAST SODIUM SCH: 10 TABLET, COATED ORAL at 21:41

## 2019-08-26 RX ADMIN — CITALOPRAM HYDROBROMIDE SCH MG: 20 TABLET ORAL at 11:01

## 2019-08-26 RX ADMIN — CLOPIDOGREL BISULFATE SCH MG: 75 TABLET, FILM COATED ORAL at 07:05

## 2019-08-26 RX ADMIN — METOPROLOL TARTRATE SCH MG: 25 TABLET, FILM COATED ORAL at 11:01

## 2019-08-26 RX ADMIN — NICOTINE SCH: 14 PATCH, EXTENDED RELEASE TRANSDERMAL at 11:01

## 2019-08-26 RX ADMIN — METOPROLOL TARTRATE SCH MG: 25 TABLET, FILM COATED ORAL at 21:41

## 2019-08-26 RX ADMIN — RANITIDINE SCH MG: 150 TABLET ORAL at 11:00

## 2019-08-26 RX ADMIN — METHADONE HYDROCHLORIDE SCH MG: 40 TABLET ORAL at 06:17

## 2019-08-26 RX ADMIN — ATORVASTATIN CALCIUM SCH MG: 40 TABLET, FILM COATED ORAL at 21:41

## 2019-08-26 RX ADMIN — Medication SCH TAB: at 11:00

## 2019-08-26 RX ADMIN — LISINOPRIL SCH MG: 5 TABLET ORAL at 11:03

## 2019-08-26 RX ADMIN — ASPIRIN 81 MG SCH MG: 81 TABLET ORAL at 11:03

## 2019-08-27 RX ADMIN — METOPROLOL TARTRATE SCH MG: 25 TABLET, FILM COATED ORAL at 09:39

## 2019-08-27 RX ADMIN — RANITIDINE SCH MG: 150 TABLET ORAL at 09:38

## 2019-08-27 RX ADMIN — LISINOPRIL SCH MG: 5 TABLET ORAL at 09:39

## 2019-08-27 RX ADMIN — METOPROLOL TARTRATE SCH MG: 25 TABLET, FILM COATED ORAL at 21:16

## 2019-08-27 RX ADMIN — Medication SCH MG: at 21:16

## 2019-08-27 RX ADMIN — CITALOPRAM HYDROBROMIDE SCH MG: 20 TABLET ORAL at 09:39

## 2019-08-27 RX ADMIN — NICOTINE SCH: 14 PATCH, EXTENDED RELEASE TRANSDERMAL at 09:38

## 2019-08-27 RX ADMIN — ATORVASTATIN CALCIUM SCH MG: 40 TABLET, FILM COATED ORAL at 21:16

## 2019-08-27 RX ADMIN — ASPIRIN 81 MG SCH MG: 81 TABLET ORAL at 09:38

## 2019-08-27 RX ADMIN — Medication PRN MG: at 21:16

## 2019-08-27 RX ADMIN — MONTELUKAST SODIUM SCH: 10 TABLET, COATED ORAL at 21:17

## 2019-08-27 RX ADMIN — METHADONE HYDROCHLORIDE SCH MG: 40 TABLET ORAL at 06:19

## 2019-08-27 RX ADMIN — Medication SCH TAB: at 09:39

## 2019-08-27 RX ADMIN — RANITIDINE SCH MG: 150 TABLET ORAL at 21:16

## 2019-08-27 RX ADMIN — CLOPIDOGREL BISULFATE SCH MG: 75 TABLET, FILM COATED ORAL at 07:10

## 2019-08-28 RX ADMIN — METHADONE HYDROCHLORIDE SCH MG: 40 TABLET ORAL at 06:10

## 2019-08-28 RX ADMIN — METOPROLOL TARTRATE SCH MG: 25 TABLET, FILM COATED ORAL at 10:14

## 2019-08-28 RX ADMIN — RANITIDINE SCH MG: 150 TABLET ORAL at 21:37

## 2019-08-28 RX ADMIN — Medication SCH TAB: at 10:13

## 2019-08-28 RX ADMIN — METOPROLOL TARTRATE SCH MG: 25 TABLET, FILM COATED ORAL at 21:37

## 2019-08-28 RX ADMIN — CLOPIDOGREL BISULFATE SCH MG: 75 TABLET, FILM COATED ORAL at 07:04

## 2019-08-28 RX ADMIN — CITALOPRAM HYDROBROMIDE SCH MG: 20 TABLET ORAL at 10:13

## 2019-08-28 RX ADMIN — NICOTINE SCH: 14 PATCH, EXTENDED RELEASE TRANSDERMAL at 10:14

## 2019-08-28 RX ADMIN — LISINOPRIL SCH MG: 5 TABLET ORAL at 10:14

## 2019-08-28 RX ADMIN — Medication PRN MG: at 21:37

## 2019-08-28 RX ADMIN — ATORVASTATIN CALCIUM SCH MG: 40 TABLET, FILM COATED ORAL at 21:37

## 2019-08-28 RX ADMIN — RANITIDINE SCH MG: 150 TABLET ORAL at 10:13

## 2019-08-28 RX ADMIN — ASPIRIN 81 MG SCH MG: 81 TABLET ORAL at 10:13

## 2019-08-28 RX ADMIN — MONTELUKAST SODIUM SCH: 10 TABLET, COATED ORAL at 21:37

## 2019-08-28 RX ADMIN — Medication SCH MG: at 21:37

## 2019-08-29 VITALS — TEMPERATURE: 98.1 F

## 2019-08-29 RX ADMIN — LISINOPRIL SCH MG: 5 TABLET ORAL at 10:16

## 2019-08-29 RX ADMIN — Medication SCH MG: at 21:17

## 2019-08-29 RX ADMIN — MONTELUKAST SODIUM SCH: 10 TABLET, COATED ORAL at 21:17

## 2019-08-29 RX ADMIN — CLOPIDOGREL BISULFATE SCH: 75 TABLET, FILM COATED ORAL at 07:30

## 2019-08-29 RX ADMIN — METOPROLOL TARTRATE SCH MG: 25 TABLET, FILM COATED ORAL at 10:16

## 2019-08-29 RX ADMIN — CITALOPRAM HYDROBROMIDE SCH MG: 20 TABLET ORAL at 10:16

## 2019-08-29 RX ADMIN — ATORVASTATIN CALCIUM SCH MG: 40 TABLET, FILM COATED ORAL at 21:16

## 2019-08-29 RX ADMIN — METHADONE HYDROCHLORIDE SCH MG: 40 TABLET ORAL at 06:20

## 2019-08-29 RX ADMIN — ASPIRIN 81 MG SCH MG: 81 TABLET ORAL at 10:16

## 2019-08-29 RX ADMIN — RANITIDINE SCH MG: 150 TABLET ORAL at 10:16

## 2019-08-29 RX ADMIN — METOPROLOL TARTRATE SCH MG: 25 TABLET, FILM COATED ORAL at 21:16

## 2019-08-29 RX ADMIN — Medication PRN MG: at 21:17

## 2019-08-29 RX ADMIN — NICOTINE SCH: 14 PATCH, EXTENDED RELEASE TRANSDERMAL at 10:16

## 2019-08-29 RX ADMIN — Medication SCH TAB: at 10:16

## 2019-08-29 RX ADMIN — RANITIDINE SCH MG: 150 TABLET ORAL at 21:17

## 2019-08-29 NOTE — PN
[facility] Progress Note


Note: 





Patient is scheduled for discharge  tomorrow. Script for 30 days supply of 

Celexa 20 mg/day will be electronically transmitted to [facility] at [address] [place], [place] [id number]

## 2019-08-30 VITALS — SYSTOLIC BLOOD PRESSURE: 107 MMHG | DIASTOLIC BLOOD PRESSURE: 65 MMHG | HEART RATE: 61 BPM

## 2019-08-30 RX ADMIN — CLOPIDOGREL BISULFATE SCH MG: 75 TABLET, FILM COATED ORAL at 07:00

## 2019-08-30 NOTE — DS
[facility] Rehab Discharge Summary





- [facility] Rehab Discharge Summary


Admission Date: 08/08/19


Discharge Date: 08/30/19





- History


Present History: Alcohol dependence, Cocaine dependence, MMTP, Opioid dependence





- Discharge Physical Exam


Vital Signs: 


 Vital Signs











Temperature  98.1 F   08/30/19 07:04


 


Pulse Rate  61   08/30/19 07:04


 


Respiratory Rate  18   08/30/19 07:04


 


Blood Pressure  107/65   08/30/19 07:04


 


O2 Sat by Pulse Oximetry (%)      











Pertinent Admission Physical Exam Findings: 





PE








alert and oriented x 3


skin warm, + facial moisture


neck supple, no jvd


car s1s2


resp cta bl


ext full rom, amb ad yesika, no tremors








- Treatment


Discharge Condition: Discharge condition good


Hospital Course: 





Patient completed rehab today and reports he was able to attain all rehab 

goals. Patient attended group meetings and is motivated to maintain sobriety. 

He is medically stable upon discharge and denies SI/HI. 





- Medication


Discharge Medications: 


Ambulatory Orders





Citalopram Hydrobromide [Celexa -] 20 mg PO DAILY #30 tablet 12/03/16 


Aspirin [ASA -] 81 mg PO DAILY #30 tab.chew 01/31/18 


Atorvastatin Ca [Lipitor] 40 mg PO HS #30 tablet 01/31/18 


Pantoprazole Sodium [Protonix -] 40 mg PO DAILY #30 tablet.ec 01/31/18 


Clopidogrel Bisulfate [Plavix -] 75 mg PO DAILY 08/08/19 


Lisinopril [Prinivil -] 5 mg PO DAILY 08/08/19 


Metoprolol Tartrate [Lopressor -] 25 mg PO BID 08/08/19 


Montelukast Na [Singulair -] 10 mg PO HS 08/08/19 


Aspirin [ASA -] 81 mg PO DAILY 15 Days #15 tab.chew 08/29/19 


Atorvastatin Ca [Lipitor] 40 mg PO HS 15 Days #15 tablet 08/29/19 


Citalopram Hydrobromide [Celexa -] 20 mg PO DAILY #30 tablet 08/29/19 


Clopidogrel Bisulfate [Plavix -] 75 mg PO DAILY@0800 15 Days #15 tablet 08/29/ 19 


Lisinopril [Prinivil] 5 mg PO DAILY 15 Days #15 tablet 08/29/19 


Metoprolol Tartrate [Lopressor -] 25 mg PO BID 30 Days #30 tablet 08/29/19 


Montelukast Na [Singulair -] 10 mg PO HS 15 Days #15 tablet 08/29/19 


Ranitidine [Zantac -] 150 mg PO BID 30 Days #30 tablet 08/29/19 











- Medication-Assisted Treatment (MAT)


Medication-Assisted Treatment (MAT): Yes


MAT Follow-up Referral: 





MMTP at [facility] appt for tomorrow morning, 8/31/19 at 6:30am. Aftercare 

also arranged for [facility] on 9/3/19 at 10am. 





- Discharge Instructions


Diet, activity, other medical instructions: 





Diet:





Activity: 





Other medical instructions:








- Follow-up Referral


Minutes to complete discharge: 30





- AMA


Did Patient Leave Against Medical Advice: No

## 2020-02-04 ENCOUNTER — HOSPITAL ENCOUNTER (INPATIENT)
Dept: HOSPITAL 74 - YASAS | Age: 55
LOS: 27 days | Discharge: HOME | DRG: 772 | End: 2020-03-02
Attending: ALLERGY & IMMUNOLOGY | Admitting: ALLERGY & IMMUNOLOGY
Payer: COMMERCIAL

## 2020-02-04 VITALS — BODY MASS INDEX: 34.4 KG/M2

## 2020-02-04 DIAGNOSIS — Z91.013: ICD-10-CM

## 2020-02-04 DIAGNOSIS — I25.10: ICD-10-CM

## 2020-02-04 DIAGNOSIS — F41.8: ICD-10-CM

## 2020-02-04 DIAGNOSIS — Z95.5: ICD-10-CM

## 2020-02-04 DIAGNOSIS — B18.2: ICD-10-CM

## 2020-02-04 DIAGNOSIS — I10: ICD-10-CM

## 2020-02-04 DIAGNOSIS — Z86.69: ICD-10-CM

## 2020-02-04 DIAGNOSIS — Z79.82: ICD-10-CM

## 2020-02-04 DIAGNOSIS — Z79.02: ICD-10-CM

## 2020-02-04 DIAGNOSIS — F19.282: ICD-10-CM

## 2020-02-04 DIAGNOSIS — Z95.1: ICD-10-CM

## 2020-02-04 DIAGNOSIS — F40.01: ICD-10-CM

## 2020-02-04 DIAGNOSIS — I25.2: ICD-10-CM

## 2020-02-04 DIAGNOSIS — F13.20: ICD-10-CM

## 2020-02-04 DIAGNOSIS — F32.9: ICD-10-CM

## 2020-02-04 DIAGNOSIS — F14.20: ICD-10-CM

## 2020-02-04 DIAGNOSIS — F19.280: ICD-10-CM

## 2020-02-04 DIAGNOSIS — F10.20: Primary | ICD-10-CM

## 2020-02-04 DIAGNOSIS — F11.20: ICD-10-CM

## 2020-02-04 DIAGNOSIS — F17.210: ICD-10-CM

## 2020-02-04 DIAGNOSIS — K21.9: ICD-10-CM

## 2020-02-04 PROCEDURE — HZ42ZZZ GROUP COUNSELING FOR SUBSTANCE ABUSE TREATMENT, COGNITIVE-BEHAVIORAL: ICD-10-PCS | Performed by: ALLERGY & IMMUNOLOGY

## 2020-02-04 RX ADMIN — ATORVASTATIN CALCIUM SCH MG: 40 TABLET, FILM COATED ORAL at 21:49

## 2020-02-04 RX ADMIN — Medication SCH MG: at 21:49

## 2020-02-04 NOTE — PN
Teaching Attending Note


Name of Resident: Tammy Diez





ATTENDING PHYSICIAN STATEMENT





I saw and evaluated the patient.


I reviewed the resident's note and discussed the case with the resident.


I agree with the resident's findings and plan as documented.








SUBJECTIVE:








OBJECTIVE:








ASSESSMENT AND PLAN:


Admit for Rehab, alcohol and cocaine, benzos

## 2020-02-04 NOTE — HP
CIWA Score





- Admission Criteria


OASAS Guidelines: Admission for Medically Managed Detox: 


Requires at least one of the followin. CIWA greater than 12


2. Seizures within the past 24 hours


3. Delirium tremens within the past 24 hours


4. Hallucinations within the past 24 hours


5. Acute intervention needed for co  occurring medical disorder


6. Acute intervention needed for co  occurring psychiatric disorder


7. Severe withdrawal that cannot be handled at a lower level of care (continued


    vomiting, continued diarrhea, abnormal vital signs) requiring intravenous


    medication and/or fluids


8. Pregnancy








Admitting History and Physical





- Admission


History of Present Illness: 





53 y/o M with PMH CAD s/p MI w/ CABG (; 2 stents), hep c (not tx), HTN, HLD

, anxiety, panic disorder, agoraphobia, who presents for rehab from benzos, 

heroin, crack cocaine, alcohol. Per pt, he was just at [facility] to complete his 

detox, which ended on saturday. states he smoked 1 crack yesterday. 





Was using benzos (xanax) 3x a week, 2 mg at a time. Last use 7 days ago. Uses 

them because it makes him feel "relaxed." Has been using since age 27. 


Heroin last used 4 mo ago. Prior IVDU. (MADELYNE, b/l antecubital fossa), no hx 

endocarditis, cellulitis or abscesses. was abstinent for 10 yrs. Is part of 

[facility] MTD program. On 70mg qd. Has been there for 3yrs.


Smokes crack cocaine, $10/day since age 27. last use yesterday. 


Alcohol: 3 nips / day. started 10 y ago. longest sobriety 5 years . endorses 

withdrawal seizures > 5 y ago. 





Last rehab admission 2019





Pt plans to go to meetings and relocate to [place] with Significant other. he has 

support system at home. unemployed. 





PsxH: CABG as above


allergies: shellfish


FH: father - [place] age 43. mother - [place]; age 82, emphysema 


Smokes 7 cigarettes since age 19.





History Source: Patient


Limitations to Obtaining History: No Limitations





- Smoking History


Smoking history: Current every day smoker


Have you smoked in the past 12 months: Yes


Aproximately how many cigarettes per day: 7





- Alcohol/Substance Use


Hx Alcohol Use: No





Admission ROS S





- HPI


Allergies/Adverse Reactions: 


 Allergies











Allergy/AdvReac Type Severity Reaction Status Date / Time


 


shellfish derived Allergy Severe Swelling Verified 20 14:24


 


No Known Drug Allergies Allergy   Verified 20 14:24











Exam Limitations: No Limitations





- Ebola screening


Do you have a fever: No





- Review of Systems


Constitutional: No Symptoms Reported


EENT: reports: No Symptoms Reported


Respiratory: reports: No Symptoms reported


Cardiac: reports: No Symptoms Reported


GI: reports: No Symptoms Reported


: reports: No Symptoms Reported


Musculoskeletal: reports: No Symptoms Reported


Integumentary: reports: No Symptoms Reported


Neuro: reports: No Symptoms reported


Endocrine: reports: No Symptoms Reported


Hematology: reports: No Symptoms Reported


Psychiatric: reports: No Sypmtoms Reported





Patient History





- Patient Medical History


Hx Anemia: No


Hx Asthma: No


Hx Chronic Obstructive Pulmonary Disease (COPD): No


Hx Cancer: No


Hx Cardiac Disorders: Yes (CABG)


Hx Congestive Heart Failure: No


Hx Hypertension: Yes


Hx Hypercholesterolemia: Yes (on med)


Hx Pacemaker: No


HX Cerebrovascular Accident: No


Hx Seizures: No


Hx Dementia: No


Hx Diabetes: No


Hx Gastrointestinal Disorders: No


Hx Liver Disease: No


Hx Genitourinary Disorders: No


Hx Sexually Transmitted Disorders: No


Hx Renal Disease (ESRD): No


Hx Thyroid Disease: No


Hx Human Immunodeficiency Virus (HIV): No (last 07/07/15 to 07/11/15 negative)


Hx Hepatitis C: Yes (not tx)


Hx Depression: Yes


Hx Suicide Attempt: No


Hx Bipolar Disorder: No


Hx Schizophrenia: No





- Patient Surgical History


Past Surgical History: Yes


Hx Neurologic Surgery: No


Hx Cataract Extraction: No


Hx Cardiac Surgery: Yes (- ptca - 2 stents )


Hx Lung Surgery: No


Hx Breast Surgery: No


Hx Breast Biopsy: No


Hx Abdominal Surgery: No


Hx Appendectomy: No


Hx Cholecystectomy: No


Hx Genitourinary Surgery: No


Hx  Section: No


Hx Orthopedic Surgery: No


Anesthesia Reaction: No





- PPD History


Previous Implant?: Yes


Documented Results: Negative w/o proof


Date: 18


Results: negative





- Smoking Cessation


Smoking history: Current every day smoker


Have you smoked in the past 12 months: Yes


Aproximately how many cigarettes per day: 7


Cigars Per Day: 0


Hx Chewing Tobacco Use: No


Initiated information on smoking cessation: Yes


'Breaking Loose' booklet given: 20





- Substances abused


  ** Alcohol


Substance route: Oral


Frequency: Daily


Amount used: 5 nips of vodka


Age of first use: 19


Date of last use: 20





  ** Crack


Substance route: Smoking


Frequency: Daily


Amount used: $20


Age of first use: 30


Date of last use: 20





  ** Alprazolam (Xanax)


Other (specify): 2mg


Substance route: Oral


Frequency: 3-6 times per week


Amount used: 1 pill


Age of first use: 37


Date of last use: 20





Admission Physical Exam [facility]





- Vital Signs


Vital Signs: 


 Vital Signs - 24 hr











  20





  14:23


 


Temperature 98.0 F


 


Pulse Rate 79


 


Respiratory 16





Rate 


 


Blood Pressure 110/72














- Physical


General Appearance: Yes: Within Normal Limits, No Apparent Distress, Nourished, 

Appropriately Dressed


HEENTM: Yes: Hearing grossly Normal, Normocephalic, Normal Voice, ZARA


Respiratory: Yes: Chest Non-Tender, Lungs Clear, Normal Breath Sounds


Cardiology: Yes: Regular Rhythm, Regular Rate, S1, S2.  No: JVD, Murmur


Abdominal: Yes: Normal Bowel Sounds, Non Tender, Flat, Soft


Back: No: CVA Tenderness


Extremities: Yes: Normal Capillary Refill, Normal Inspection, Normal Range of 

Motion


Neurological: Yes: Within Normal Limits, Fully Oriented


Integumentary: Yes: Normal Color, Dry, Warm





Cleared for Admission [facility]





- Detox or Rehab


[facility] Level of Care: Medically Managed


Claeared for Rehab Admission: Yes





Breathalyzer





- Breathalyzer


Breathalyzer: 0





Urine Drug Screen





- Test Device


Lot number: [id number]


Expiration date: 21





- Control


Is test valid?: Yes





- Results


Drug screen NEGATIVE: No


Urine drug screen results: LORIE-Cocaine, MTD-Methadone, BZO-Benzodiazepines





Inpatient Rehab Admission





- Rehab Decision to Admit


Inpatient rehab admission?: Yes





- Initial Determination


Are CD services needed?: Yes


Free of communicable disease: Yes


Not in need of hospitalization: No





- Rehab Admission Criteria


Previous failed treatment: Yes


Poor recovery environment: Yes


Comorbidities: Yes


Lacks judgement: Yes


Patient is meeting Inpatient Rehab admission criteria:: Yes

## 2020-02-05 LAB
ALBUMIN SERPL-MCNC: 3.2 G/DL (ref 3.4–5)
ALP SERPL-CCNC: 124 U/L (ref 45–117)
ALT SERPL-CCNC: 49 U/L (ref 13–61)
ANION GAP SERPL CALC-SCNC: 5 MMOL/L (ref 8–16)
AST SERPL-CCNC: 24 U/L (ref 15–37)
BILIRUB SERPL-MCNC: 0.2 MG/DL (ref 0.2–1)
BUN SERPL-MCNC: 13.2 MG/DL (ref 7–18)
CALCIUM SERPL-MCNC: 9 MG/DL (ref 8.5–10.1)
CHLORIDE SERPL-SCNC: 110 MMOL/L (ref 98–107)
CO2 SERPL-SCNC: 27 MMOL/L (ref 21–32)
CREAT SERPL-MCNC: 1 MG/DL (ref 0.55–1.3)
DEPRECATED RDW RBC AUTO: 15.5 % (ref 11.9–15.9)
GLUCOSE SERPL-MCNC: 168 MG/DL (ref 74–106)
HCT VFR BLD CALC: 41 % (ref 35.4–49)
HGB BLD-MCNC: 13.7 GM/DL (ref 11.7–16.9)
MCH RBC QN AUTO: 29.7 PG (ref 25.7–33.7)
MCHC RBC AUTO-ENTMCNC: 33.4 G/DL (ref 32–35.9)
MCV RBC: 89 FL (ref 80–96)
PLATELET # BLD AUTO: 236 K/MM3 (ref 134–434)
PMV BLD: 10 FL (ref 7.5–11.1)
POTASSIUM SERPLBLD-SCNC: 4.3 MMOL/L (ref 3.5–5.1)
PROT SERPL-MCNC: 7.1 G/DL (ref 6.4–8.2)
RBC # BLD AUTO: 4.61 M/MM3 (ref 4–5.6)
SODIUM SERPL-SCNC: 143 MMOL/L (ref 136–145)
WBC # BLD AUTO: 8.5 K/MM3 (ref 4–10)

## 2020-02-05 RX ADMIN — CITALOPRAM HYDROBROMIDE SCH MG: 20 TABLET ORAL at 13:22

## 2020-02-05 RX ADMIN — ATORVASTATIN CALCIUM SCH MG: 40 TABLET, FILM COATED ORAL at 21:18

## 2020-02-05 RX ADMIN — FAMOTIDINE SCH MG: 20 TABLET ORAL at 11:00

## 2020-02-05 RX ADMIN — Medication SCH TAB: at 09:22

## 2020-02-05 RX ADMIN — Medication SCH MG: at 21:18

## 2020-02-05 RX ADMIN — HYDROXYZINE PAMOATE PRN MG: 25 CAPSULE ORAL at 13:22

## 2020-02-05 RX ADMIN — NICOTINE SCH: 7 PATCH TRANSDERMAL at 09:22

## 2020-02-05 RX ADMIN — FAMOTIDINE SCH MG: 20 TABLET ORAL at 21:18

## 2020-02-05 RX ADMIN — METHADONE HYDROCHLORIDE SCH MG: 40 TABLET ORAL at 05:58

## 2020-02-05 RX ADMIN — ASPIRIN 81 MG SCH MG: 81 TABLET ORAL at 09:22

## 2020-02-05 NOTE — PN
BHS Progress Note


Note: 





Pt is a 53 y/o male with a hx of VIJAY-alcohol,crack,xanax and on Methadone 70 mg 

po daily admitted to rehab through [facility] yesterday 2/4/20. PMHx:HTN,HLD, Hep C(

untreated), GERD. Surgical Hx:CABG with 2 stents. Psych Hx:Depression. Pt 

reports he has no current primary care doctor but had been with Dr Card on 

[place], [place] but has not been compliant with medical management. 

Reports last saw him about 9 months ago. Pt reports he has a cardiolgist Dr. Cristian Levin at [facility]  on [address] [place] [address]. Pt reports he has been very noncompliant with his medications and doctors' 

visits due to "I've jus been busy using drugs, being reluctant and not taking 

care of myself". Pt reports he is on medications for his heart and would want 

to restart them. 


 


 


 Vital Signs - 24 hr











  02/04/20 02/04/20 02/05/20





  14:23 16:05 01:19


 


Temperature 98.0 F 97.9 F 


 


Pulse Rate 79 79 


 


Respiratory 16 18 20





Rate   


 


Blood Pressure 110/72 129/70 














  02/05/20 02/05/20





  03:30 07:43


 


Temperature  98.8 F


 


Pulse Rate  82


 


Respiratory 18 18





Rate  


 


Blood Pressure  107/64








 Laboratory Tests











  02/04/20





  06:00


 


WBC  8.5


 


RBC  4.61


 


Hgb  13.7


 


Hct  41.0


 


MCV  89.0


 


MCH  29.7


 


MCHC  33.4


 


RDW  15.5


 


Plt Count  236  D


 


MPV  10.0








Alert o x 3,denies s/h/i


nad


oob ambulating with steady gait.





A/P


new rehab pt


VIJAY


MMTP-Promesa on Methadone 70 mg po daily





Maintain safety


cont rehab


Review home meds and reorder as necessary


called pt's home pharmacy, [facility] at [phone] and pharmacy states 

will fax copy of medications to the unit.


Spoke to pharmacist, Charu who confirmed pt's home medications with last  

in August of 2019.
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
## 2020-02-06 RX ADMIN — HYDROXYZINE PAMOATE PRN MG: 25 CAPSULE ORAL at 10:22

## 2020-02-06 RX ADMIN — CITALOPRAM HYDROBROMIDE SCH MG: 20 TABLET ORAL at 10:22

## 2020-02-06 RX ADMIN — Medication SCH TAB: at 10:22

## 2020-02-06 RX ADMIN — Medication PRN MG: at 21:42

## 2020-02-06 RX ADMIN — METHADONE HYDROCHLORIDE SCH MG: 40 TABLET ORAL at 06:10

## 2020-02-06 RX ADMIN — FAMOTIDINE SCH MG: 20 TABLET ORAL at 10:22

## 2020-02-06 RX ADMIN — NICOTINE SCH: 7 PATCH TRANSDERMAL at 10:22

## 2020-02-06 RX ADMIN — ASPIRIN 81 MG SCH MG: 81 TABLET ORAL at 10:22

## 2020-02-06 RX ADMIN — CLOPIDOGREL BISULFATE SCH MG: 75 TABLET, FILM COATED ORAL at 06:11

## 2020-02-06 RX ADMIN — FAMOTIDINE SCH MG: 20 TABLET ORAL at 21:41

## 2020-02-06 RX ADMIN — Medication SCH MG: at 21:41

## 2020-02-06 RX ADMIN — ATORVASTATIN CALCIUM SCH MG: 40 TABLET, FILM COATED ORAL at 21:41

## 2020-02-07 RX ADMIN — FAMOTIDINE SCH MG: 20 TABLET ORAL at 10:54

## 2020-02-07 RX ADMIN — Medication PRN MG: at 21:09

## 2020-02-07 RX ADMIN — ATORVASTATIN CALCIUM SCH MG: 40 TABLET, FILM COATED ORAL at 21:09

## 2020-02-07 RX ADMIN — Medication SCH MG: at 21:09

## 2020-02-07 RX ADMIN — METHADONE HYDROCHLORIDE SCH MG: 40 TABLET ORAL at 06:19

## 2020-02-07 RX ADMIN — CLOPIDOGREL BISULFATE SCH MG: 75 TABLET, FILM COATED ORAL at 06:18

## 2020-02-07 RX ADMIN — CITALOPRAM HYDROBROMIDE SCH MG: 20 TABLET ORAL at 10:53

## 2020-02-07 RX ADMIN — ASPIRIN 81 MG SCH MG: 81 TABLET ORAL at 10:53

## 2020-02-07 RX ADMIN — FAMOTIDINE SCH MG: 20 TABLET ORAL at 21:09

## 2020-02-07 RX ADMIN — NICOTINE SCH: 7 PATCH TRANSDERMAL at 10:54

## 2020-02-07 RX ADMIN — Medication SCH TAB: at 10:53

## 2020-02-07 RX ADMIN — HYDROXYZINE PAMOATE PRN MG: 25 CAPSULE ORAL at 10:55

## 2020-02-08 RX ADMIN — METHADONE HYDROCHLORIDE SCH MG: 40 TABLET ORAL at 06:07

## 2020-02-08 RX ADMIN — ASPIRIN 81 MG SCH MG: 81 TABLET ORAL at 10:46

## 2020-02-08 RX ADMIN — FAMOTIDINE SCH MG: 20 TABLET ORAL at 10:46

## 2020-02-08 RX ADMIN — CLOPIDOGREL BISULFATE SCH MG: 75 TABLET, FILM COATED ORAL at 06:08

## 2020-02-08 RX ADMIN — Medication SCH TAB: at 10:46

## 2020-02-08 RX ADMIN — Medication SCH MG: at 21:47

## 2020-02-08 RX ADMIN — FAMOTIDINE SCH MG: 20 TABLET ORAL at 21:48

## 2020-02-08 RX ADMIN — NICOTINE SCH: 7 PATCH TRANSDERMAL at 10:46

## 2020-02-08 RX ADMIN — ATORVASTATIN CALCIUM SCH MG: 40 TABLET, FILM COATED ORAL at 21:49

## 2020-02-08 RX ADMIN — CITALOPRAM HYDROBROMIDE SCH MG: 20 TABLET ORAL at 10:46

## 2020-02-08 RX ADMIN — Medication PRN MG: at 21:49

## 2020-02-09 RX ADMIN — CITALOPRAM HYDROBROMIDE SCH MG: 20 TABLET ORAL at 10:36

## 2020-02-09 RX ADMIN — Medication SCH MG: at 21:39

## 2020-02-09 RX ADMIN — Medication PRN MG: at 21:40

## 2020-02-09 RX ADMIN — FAMOTIDINE SCH MG: 20 TABLET ORAL at 21:39

## 2020-02-09 RX ADMIN — Medication SCH TAB: at 10:36

## 2020-02-09 RX ADMIN — ATORVASTATIN CALCIUM SCH MG: 40 TABLET, FILM COATED ORAL at 21:39

## 2020-02-09 RX ADMIN — METHADONE HYDROCHLORIDE SCH MG: 40 TABLET ORAL at 06:24

## 2020-02-09 RX ADMIN — FAMOTIDINE SCH MG: 20 TABLET ORAL at 10:36

## 2020-02-09 RX ADMIN — CLOPIDOGREL BISULFATE SCH MG: 75 TABLET, FILM COATED ORAL at 06:23

## 2020-02-09 RX ADMIN — ASPIRIN 81 MG SCH MG: 81 TABLET ORAL at 10:37

## 2020-02-09 RX ADMIN — NICOTINE SCH: 7 PATCH TRANSDERMAL at 10:36

## 2020-02-10 RX ADMIN — Medication SCH MG: at 21:42

## 2020-02-10 RX ADMIN — FAMOTIDINE SCH MG: 20 TABLET ORAL at 10:48

## 2020-02-10 RX ADMIN — ASPIRIN 81 MG SCH MG: 81 TABLET ORAL at 10:48

## 2020-02-10 RX ADMIN — FAMOTIDINE SCH MG: 20 TABLET ORAL at 21:42

## 2020-02-10 RX ADMIN — HYDROXYZINE PAMOATE PRN MG: 25 CAPSULE ORAL at 10:50

## 2020-02-10 RX ADMIN — Medication PRN MG: at 21:42

## 2020-02-10 RX ADMIN — METHADONE HYDROCHLORIDE SCH MG: 40 TABLET ORAL at 06:06

## 2020-02-10 RX ADMIN — HYDROXYZINE PAMOATE PRN MG: 25 CAPSULE ORAL at 21:43

## 2020-02-10 RX ADMIN — CITALOPRAM HYDROBROMIDE SCH MG: 20 TABLET ORAL at 10:48

## 2020-02-10 RX ADMIN — NICOTINE SCH: 7 PATCH TRANSDERMAL at 10:49

## 2020-02-10 RX ADMIN — CLOPIDOGREL BISULFATE SCH MG: 75 TABLET, FILM COATED ORAL at 06:06

## 2020-02-10 RX ADMIN — Medication SCH TAB: at 10:48

## 2020-02-10 RX ADMIN — ATORVASTATIN CALCIUM SCH MG: 40 TABLET, FILM COATED ORAL at 21:42

## 2020-02-11 RX ADMIN — CLOPIDOGREL BISULFATE SCH MG: 75 TABLET, FILM COATED ORAL at 06:07

## 2020-02-11 RX ADMIN — HYDROXYZINE PAMOATE PRN MG: 25 CAPSULE ORAL at 21:12

## 2020-02-11 RX ADMIN — FAMOTIDINE SCH MG: 20 TABLET ORAL at 10:44

## 2020-02-11 RX ADMIN — ATORVASTATIN CALCIUM SCH MG: 40 TABLET, FILM COATED ORAL at 21:11

## 2020-02-11 RX ADMIN — ASPIRIN 81 MG SCH MG: 81 TABLET ORAL at 10:44

## 2020-02-11 RX ADMIN — Medication PRN MG: at 21:11

## 2020-02-11 RX ADMIN — FAMOTIDINE SCH MG: 20 TABLET ORAL at 21:11

## 2020-02-11 RX ADMIN — NICOTINE SCH: 7 PATCH TRANSDERMAL at 10:44

## 2020-02-11 RX ADMIN — Medication SCH TAB: at 10:44

## 2020-02-11 RX ADMIN — CITALOPRAM HYDROBROMIDE SCH MG: 20 TABLET ORAL at 10:44

## 2020-02-11 RX ADMIN — METHADONE HYDROCHLORIDE SCH MG: 40 TABLET ORAL at 06:07

## 2020-02-11 RX ADMIN — Medication SCH MG: at 21:11

## 2020-02-11 RX ADMIN — HYDROXYZINE PAMOATE PRN MG: 25 CAPSULE ORAL at 10:45

## 2020-02-11 NOTE — PN
BHS Progress Note


Note: 





Pt c/o bruise to right forearm stating after phlebotomy blood draw a few days 

ago. Reports mild tenderness to area.


 


 Vital Signs - 24 hr











  02/11/20 02/11/20





  00:30 07:17


 


Temperature  97.7 F


 


Pulse Rate  67


 


Respiratory 18 18





Rate  


 


Blood Pressure  144/72








Alert o x 3


nad


oob ambulating with steady gait


skin:Right forearm with ecchymotic skin area, no open skin or drainage, no 

swelling.





A/p


skin ecchymosis, right forearm











Apply ice sonja as needed.

## 2020-02-12 RX ADMIN — CITALOPRAM HYDROBROMIDE SCH MG: 20 TABLET ORAL at 09:36

## 2020-02-12 RX ADMIN — Medication SCH TAB: at 09:36

## 2020-02-12 RX ADMIN — CLOPIDOGREL BISULFATE SCH MG: 75 TABLET, FILM COATED ORAL at 06:03

## 2020-02-12 RX ADMIN — ASPIRIN 81 MG SCH MG: 81 TABLET ORAL at 09:36

## 2020-02-12 RX ADMIN — FAMOTIDINE SCH MG: 20 TABLET ORAL at 21:13

## 2020-02-12 RX ADMIN — ATORVASTATIN CALCIUM SCH MG: 40 TABLET, FILM COATED ORAL at 21:12

## 2020-02-12 RX ADMIN — Medication SCH MG: at 21:13

## 2020-02-12 RX ADMIN — Medication PRN MG: at 21:13

## 2020-02-12 RX ADMIN — ACETAMINOPHEN PRN MG: 325 TABLET ORAL at 09:36

## 2020-02-12 RX ADMIN — NICOTINE SCH: 7 PATCH TRANSDERMAL at 09:38

## 2020-02-12 RX ADMIN — FAMOTIDINE SCH MG: 20 TABLET ORAL at 09:36

## 2020-02-12 RX ADMIN — HYDROXYZINE PAMOATE PRN MG: 25 CAPSULE ORAL at 15:27

## 2020-02-12 RX ADMIN — METHADONE HYDROCHLORIDE SCH MG: 40 TABLET ORAL at 06:03

## 2020-02-13 RX ADMIN — CITALOPRAM HYDROBROMIDE SCH MG: 20 TABLET ORAL at 10:27

## 2020-02-13 RX ADMIN — ASPIRIN 81 MG SCH MG: 81 TABLET ORAL at 10:27

## 2020-02-13 RX ADMIN — Medication PRN MG: at 21:24

## 2020-02-13 RX ADMIN — NICOTINE SCH: 7 PATCH TRANSDERMAL at 10:27

## 2020-02-13 RX ADMIN — ATORVASTATIN CALCIUM SCH MG: 40 TABLET, FILM COATED ORAL at 21:23

## 2020-02-13 RX ADMIN — METHADONE HYDROCHLORIDE SCH MG: 40 TABLET ORAL at 06:28

## 2020-02-13 RX ADMIN — FAMOTIDINE SCH MG: 20 TABLET ORAL at 21:24

## 2020-02-13 RX ADMIN — Medication SCH TAB: at 10:27

## 2020-02-13 RX ADMIN — Medication SCH MG: at 21:25

## 2020-02-13 RX ADMIN — HYDROXYZINE PAMOATE PRN MG: 25 CAPSULE ORAL at 10:27

## 2020-02-13 RX ADMIN — CLOPIDOGREL BISULFATE SCH MG: 75 TABLET, FILM COATED ORAL at 06:28

## 2020-02-13 RX ADMIN — FAMOTIDINE SCH MG: 20 TABLET ORAL at 10:27

## 2020-02-13 RX ADMIN — HYDROXYZINE PAMOATE PRN MG: 25 CAPSULE ORAL at 21:24

## 2020-02-14 RX ADMIN — NICOTINE SCH: 7 PATCH TRANSDERMAL at 10:37

## 2020-02-14 RX ADMIN — METHADONE HYDROCHLORIDE SCH MG: 40 TABLET ORAL at 06:09

## 2020-02-14 RX ADMIN — Medication SCH MG: at 21:47

## 2020-02-14 RX ADMIN — CLOPIDOGREL BISULFATE SCH MG: 75 TABLET, FILM COATED ORAL at 06:08

## 2020-02-14 RX ADMIN — Medication SCH TAB: at 10:37

## 2020-02-14 RX ADMIN — CITALOPRAM HYDROBROMIDE SCH MG: 20 TABLET ORAL at 10:37

## 2020-02-14 RX ADMIN — ATORVASTATIN CALCIUM SCH MG: 40 TABLET, FILM COATED ORAL at 21:45

## 2020-02-14 RX ADMIN — ASPIRIN 81 MG SCH MG: 81 TABLET ORAL at 10:37

## 2020-02-14 RX ADMIN — FAMOTIDINE SCH MG: 20 TABLET ORAL at 21:45

## 2020-02-14 RX ADMIN — SUVOREXANT PRN MG: 10 TABLET, FILM COATED ORAL at 21:47

## 2020-02-14 RX ADMIN — FAMOTIDINE SCH MG: 20 TABLET ORAL at 10:37

## 2020-02-14 NOTE — PN
BHS Progress Note


Note: 





Patient complains of experiencing difficulty to sleep despite taking Melatonin 

10 mg/hs. Will start Belsomra 10 mg/hs prn for insomnia

## 2020-02-15 RX ADMIN — NICOTINE SCH: 7 PATCH TRANSDERMAL at 10:37

## 2020-02-15 RX ADMIN — Medication PRN MG: at 21:10

## 2020-02-15 RX ADMIN — FAMOTIDINE SCH MG: 20 TABLET ORAL at 21:10

## 2020-02-15 RX ADMIN — ATORVASTATIN CALCIUM SCH MG: 40 TABLET, FILM COATED ORAL at 21:10

## 2020-02-15 RX ADMIN — CITALOPRAM HYDROBROMIDE SCH MG: 20 TABLET ORAL at 10:37

## 2020-02-15 RX ADMIN — SUVOREXANT PRN MG: 10 TABLET, FILM COATED ORAL at 21:10

## 2020-02-15 RX ADMIN — Medication SCH MG: at 21:10

## 2020-02-15 RX ADMIN — ASPIRIN 81 MG SCH MG: 81 TABLET ORAL at 10:37

## 2020-02-15 RX ADMIN — CLOPIDOGREL BISULFATE SCH MG: 75 TABLET, FILM COATED ORAL at 06:10

## 2020-02-15 RX ADMIN — Medication SCH TAB: at 10:37

## 2020-02-15 RX ADMIN — FAMOTIDINE SCH MG: 20 TABLET ORAL at 10:37

## 2020-02-15 RX ADMIN — METHADONE HYDROCHLORIDE SCH MG: 40 TABLET ORAL at 06:09

## 2020-02-16 RX ADMIN — ATORVASTATIN CALCIUM SCH MG: 40 TABLET, FILM COATED ORAL at 21:27

## 2020-02-16 RX ADMIN — SUVOREXANT PRN MG: 10 TABLET, FILM COATED ORAL at 21:29

## 2020-02-16 RX ADMIN — CLOPIDOGREL BISULFATE SCH MG: 75 TABLET, FILM COATED ORAL at 06:07

## 2020-02-16 RX ADMIN — ASPIRIN 81 MG SCH MG: 81 TABLET ORAL at 10:16

## 2020-02-16 RX ADMIN — NICOTINE SCH: 7 PATCH TRANSDERMAL at 10:16

## 2020-02-16 RX ADMIN — Medication SCH TAB: at 10:16

## 2020-02-16 RX ADMIN — FAMOTIDINE SCH MG: 20 TABLET ORAL at 10:16

## 2020-02-16 RX ADMIN — Medication SCH MG: at 21:27

## 2020-02-16 RX ADMIN — METHADONE HYDROCHLORIDE SCH MG: 40 TABLET ORAL at 06:07

## 2020-02-16 RX ADMIN — Medication PRN MG: at 21:27

## 2020-02-16 RX ADMIN — CITALOPRAM HYDROBROMIDE SCH MG: 20 TABLET ORAL at 10:16

## 2020-02-16 RX ADMIN — FAMOTIDINE SCH MG: 20 TABLET ORAL at 21:27

## 2020-02-17 RX ADMIN — Medication SCH MG: at 21:10

## 2020-02-17 RX ADMIN — ASPIRIN 81 MG SCH MG: 81 TABLET ORAL at 10:26

## 2020-02-17 RX ADMIN — NICOTINE SCH: 7 PATCH TRANSDERMAL at 10:26

## 2020-02-17 RX ADMIN — FAMOTIDINE SCH MG: 20 TABLET ORAL at 10:26

## 2020-02-17 RX ADMIN — METHADONE HYDROCHLORIDE SCH MG: 40 TABLET ORAL at 06:04

## 2020-02-17 RX ADMIN — Medication SCH TAB: at 10:26

## 2020-02-17 RX ADMIN — CITALOPRAM HYDROBROMIDE SCH MG: 20 TABLET ORAL at 10:26

## 2020-02-17 RX ADMIN — FAMOTIDINE SCH MG: 20 TABLET ORAL at 21:09

## 2020-02-17 RX ADMIN — SUVOREXANT PRN MG: 10 TABLET, FILM COATED ORAL at 21:10

## 2020-02-17 RX ADMIN — ATORVASTATIN CALCIUM SCH MG: 40 TABLET, FILM COATED ORAL at 21:09

## 2020-02-17 RX ADMIN — CLOPIDOGREL BISULFATE SCH MG: 75 TABLET, FILM COATED ORAL at 06:05

## 2020-02-18 RX ADMIN — ACETAMINOPHEN PRN MG: 325 TABLET ORAL at 09:46

## 2020-02-18 RX ADMIN — Medication PRN MG: at 21:13

## 2020-02-18 RX ADMIN — METHADONE HYDROCHLORIDE SCH MG: 40 TABLET ORAL at 06:16

## 2020-02-18 RX ADMIN — Medication SCH TAB: at 09:45

## 2020-02-18 RX ADMIN — FAMOTIDINE SCH MG: 20 TABLET ORAL at 09:47

## 2020-02-18 RX ADMIN — NICOTINE SCH: 7 PATCH TRANSDERMAL at 09:47

## 2020-02-18 RX ADMIN — FAMOTIDINE SCH MG: 20 TABLET ORAL at 21:11

## 2020-02-18 RX ADMIN — ASPIRIN 81 MG SCH MG: 81 TABLET ORAL at 09:46

## 2020-02-18 RX ADMIN — Medication SCH MG: at 21:11

## 2020-02-18 RX ADMIN — CITALOPRAM HYDROBROMIDE SCH MG: 20 TABLET ORAL at 09:45

## 2020-02-18 RX ADMIN — CLOPIDOGREL BISULFATE SCH MG: 75 TABLET, FILM COATED ORAL at 06:16

## 2020-02-18 RX ADMIN — ATORVASTATIN CALCIUM SCH MG: 40 TABLET, FILM COATED ORAL at 21:11

## 2020-02-19 RX ADMIN — Medication SCH MG: at 21:54

## 2020-02-19 RX ADMIN — NICOTINE SCH: 7 PATCH TRANSDERMAL at 11:01

## 2020-02-19 RX ADMIN — Medication PRN MG: at 21:54

## 2020-02-19 RX ADMIN — Medication SCH TAB: at 11:01

## 2020-02-19 RX ADMIN — METHADONE HYDROCHLORIDE SCH MG: 40 TABLET ORAL at 06:32

## 2020-02-19 RX ADMIN — CITALOPRAM HYDROBROMIDE SCH MG: 20 TABLET ORAL at 11:02

## 2020-02-19 RX ADMIN — ATORVASTATIN CALCIUM SCH MG: 40 TABLET, FILM COATED ORAL at 21:54

## 2020-02-19 RX ADMIN — FAMOTIDINE SCH MG: 20 TABLET ORAL at 11:01

## 2020-02-19 RX ADMIN — FAMOTIDINE SCH MG: 20 TABLET ORAL at 21:54

## 2020-02-19 RX ADMIN — HYDROXYZINE PAMOATE PRN MG: 25 CAPSULE ORAL at 11:03

## 2020-02-19 RX ADMIN — ASPIRIN 81 MG SCH MG: 81 TABLET ORAL at 11:01

## 2020-02-19 RX ADMIN — CLOPIDOGREL BISULFATE SCH MG: 75 TABLET, FILM COATED ORAL at 06:32

## 2020-02-20 RX ADMIN — Medication SCH TAB: at 10:48

## 2020-02-20 RX ADMIN — METHADONE HYDROCHLORIDE SCH MG: 40 TABLET ORAL at 06:15

## 2020-02-20 RX ADMIN — CITALOPRAM HYDROBROMIDE SCH MG: 20 TABLET ORAL at 10:48

## 2020-02-20 RX ADMIN — ATORVASTATIN CALCIUM SCH MG: 40 TABLET, FILM COATED ORAL at 21:09

## 2020-02-20 RX ADMIN — ASPIRIN 81 MG SCH MG: 81 TABLET ORAL at 10:48

## 2020-02-20 RX ADMIN — CLOPIDOGREL BISULFATE SCH MG: 75 TABLET, FILM COATED ORAL at 06:15

## 2020-02-20 RX ADMIN — Medication PRN MG: at 21:10

## 2020-02-20 RX ADMIN — FAMOTIDINE SCH MG: 20 TABLET ORAL at 10:48

## 2020-02-20 RX ADMIN — NICOTINE SCH: 7 PATCH TRANSDERMAL at 10:48

## 2020-02-20 RX ADMIN — Medication SCH MG: at 21:09

## 2020-02-20 RX ADMIN — FAMOTIDINE SCH MG: 20 TABLET ORAL at 21:10

## 2020-02-21 RX ADMIN — NICOTINE SCH: 7 PATCH TRANSDERMAL at 10:36

## 2020-02-21 RX ADMIN — ATORVASTATIN CALCIUM SCH MG: 40 TABLET, FILM COATED ORAL at 21:07

## 2020-02-21 RX ADMIN — Medication SCH TAB: at 10:36

## 2020-02-21 RX ADMIN — CLOPIDOGREL BISULFATE SCH MG: 75 TABLET, FILM COATED ORAL at 06:02

## 2020-02-21 RX ADMIN — FAMOTIDINE SCH MG: 20 TABLET ORAL at 10:36

## 2020-02-21 RX ADMIN — FAMOTIDINE SCH MG: 20 TABLET ORAL at 21:07

## 2020-02-21 RX ADMIN — ASPIRIN 81 MG SCH MG: 81 TABLET ORAL at 10:36

## 2020-02-21 RX ADMIN — Medication PRN MG: at 21:08

## 2020-02-21 RX ADMIN — CITALOPRAM HYDROBROMIDE SCH MG: 20 TABLET ORAL at 10:36

## 2020-02-21 RX ADMIN — METHADONE HYDROCHLORIDE SCH MG: 40 TABLET ORAL at 06:03

## 2020-02-21 RX ADMIN — Medication SCH MG: at 21:08

## 2020-02-22 RX ADMIN — Medication SCH TAB: at 10:55

## 2020-02-22 RX ADMIN — HYDROXYZINE PAMOATE PRN MG: 25 CAPSULE ORAL at 21:35

## 2020-02-22 RX ADMIN — METHADONE HYDROCHLORIDE SCH MG: 40 TABLET ORAL at 06:12

## 2020-02-22 RX ADMIN — ASPIRIN 81 MG SCH MG: 81 TABLET ORAL at 10:55

## 2020-02-22 RX ADMIN — NICOTINE SCH: 7 PATCH TRANSDERMAL at 10:56

## 2020-02-22 RX ADMIN — HYDROXYZINE PAMOATE PRN MG: 25 CAPSULE ORAL at 10:57

## 2020-02-22 RX ADMIN — ATORVASTATIN CALCIUM SCH MG: 40 TABLET, FILM COATED ORAL at 21:35

## 2020-02-22 RX ADMIN — FAMOTIDINE SCH MG: 20 TABLET ORAL at 21:35

## 2020-02-22 RX ADMIN — FAMOTIDINE SCH MG: 20 TABLET ORAL at 10:56

## 2020-02-22 RX ADMIN — Medication SCH MG: at 21:36

## 2020-02-22 RX ADMIN — CLOPIDOGREL BISULFATE SCH MG: 75 TABLET, FILM COATED ORAL at 06:11

## 2020-02-22 RX ADMIN — CITALOPRAM HYDROBROMIDE SCH MG: 20 TABLET ORAL at 10:55

## 2020-02-23 RX ADMIN — Medication SCH TAB: at 10:26

## 2020-02-23 RX ADMIN — METHADONE HYDROCHLORIDE SCH MG: 40 TABLET ORAL at 06:32

## 2020-02-23 RX ADMIN — ASPIRIN 81 MG SCH MG: 81 TABLET ORAL at 10:26

## 2020-02-23 RX ADMIN — HYDROXYZINE PAMOATE PRN MG: 25 CAPSULE ORAL at 21:53

## 2020-02-23 RX ADMIN — FAMOTIDINE SCH MG: 20 TABLET ORAL at 10:26

## 2020-02-23 RX ADMIN — ATORVASTATIN CALCIUM SCH MG: 40 TABLET, FILM COATED ORAL at 21:52

## 2020-02-23 RX ADMIN — CITALOPRAM HYDROBROMIDE SCH MG: 20 TABLET ORAL at 10:26

## 2020-02-23 RX ADMIN — FAMOTIDINE SCH MG: 20 TABLET ORAL at 21:52

## 2020-02-23 RX ADMIN — CLOPIDOGREL BISULFATE SCH MG: 75 TABLET, FILM COATED ORAL at 06:33

## 2020-02-23 RX ADMIN — Medication SCH MG: at 21:52

## 2020-02-23 RX ADMIN — HYDROXYZINE PAMOATE PRN MG: 25 CAPSULE ORAL at 10:26

## 2020-02-23 RX ADMIN — Medication PRN MG: at 21:53

## 2020-02-23 RX ADMIN — NICOTINE SCH: 7 PATCH TRANSDERMAL at 10:26

## 2020-02-24 RX ADMIN — CITALOPRAM HYDROBROMIDE SCH MG: 20 TABLET ORAL at 10:41

## 2020-02-24 RX ADMIN — Medication PRN MG: at 21:47

## 2020-02-24 RX ADMIN — CLOPIDOGREL BISULFATE SCH MG: 75 TABLET, FILM COATED ORAL at 06:38

## 2020-02-24 RX ADMIN — HYDROXYZINE PAMOATE PRN MG: 25 CAPSULE ORAL at 21:46

## 2020-02-24 RX ADMIN — METHADONE HYDROCHLORIDE SCH MG: 40 TABLET ORAL at 05:56

## 2020-02-24 RX ADMIN — Medication SCH: at 21:46

## 2020-02-24 RX ADMIN — Medication SCH TAB: at 10:41

## 2020-02-24 RX ADMIN — ATORVASTATIN CALCIUM SCH MG: 40 TABLET, FILM COATED ORAL at 21:46

## 2020-02-24 RX ADMIN — FAMOTIDINE SCH MG: 20 TABLET ORAL at 10:41

## 2020-02-24 RX ADMIN — ASPIRIN 81 MG SCH MG: 81 TABLET ORAL at 10:41

## 2020-02-24 RX ADMIN — FAMOTIDINE SCH MG: 20 TABLET ORAL at 21:46

## 2020-02-24 RX ADMIN — NICOTINE SCH: 7 PATCH TRANSDERMAL at 10:41

## 2020-02-25 RX ADMIN — Medication SCH: at 21:39

## 2020-02-25 RX ADMIN — Medication SCH TAB: at 10:45

## 2020-02-25 RX ADMIN — CITALOPRAM HYDROBROMIDE SCH MG: 20 TABLET ORAL at 10:45

## 2020-02-25 RX ADMIN — HYDROXYZINE PAMOATE PRN MG: 25 CAPSULE ORAL at 21:38

## 2020-02-25 RX ADMIN — FAMOTIDINE SCH MG: 20 TABLET ORAL at 21:37

## 2020-02-25 RX ADMIN — DOCUSATE SODIUM SCH MG: 100 CAPSULE, LIQUID FILLED ORAL at 21:37

## 2020-02-25 RX ADMIN — ATORVASTATIN CALCIUM SCH MG: 40 TABLET, FILM COATED ORAL at 21:37

## 2020-02-25 RX ADMIN — Medication PRN MG: at 21:38

## 2020-02-25 RX ADMIN — CLOPIDOGREL BISULFATE SCH MG: 75 TABLET, FILM COATED ORAL at 06:29

## 2020-02-25 RX ADMIN — NICOTINE SCH: 7 PATCH TRANSDERMAL at 10:46

## 2020-02-25 RX ADMIN — METHADONE HYDROCHLORIDE SCH MG: 40 TABLET ORAL at 06:29

## 2020-02-25 RX ADMIN — ASPIRIN 81 MG SCH MG: 81 TABLET ORAL at 10:45

## 2020-02-25 RX ADMIN — FAMOTIDINE SCH MG: 20 TABLET ORAL at 10:45

## 2020-02-26 RX ADMIN — NICOTINE SCH: 7 PATCH TRANSDERMAL at 10:39

## 2020-02-26 RX ADMIN — FAMOTIDINE SCH MG: 20 TABLET ORAL at 21:32

## 2020-02-26 RX ADMIN — METHADONE HYDROCHLORIDE SCH MG: 40 TABLET ORAL at 05:48

## 2020-02-26 RX ADMIN — CLOPIDOGREL BISULFATE SCH MG: 75 TABLET, FILM COATED ORAL at 06:56

## 2020-02-26 RX ADMIN — CITALOPRAM HYDROBROMIDE SCH MG: 20 TABLET ORAL at 10:39

## 2020-02-26 RX ADMIN — HYDROXYZINE PAMOATE PRN MG: 25 CAPSULE ORAL at 21:32

## 2020-02-26 RX ADMIN — Medication SCH MG: at 21:32

## 2020-02-26 RX ADMIN — DOCUSATE SODIUM SCH MG: 100 CAPSULE, LIQUID FILLED ORAL at 21:31

## 2020-02-26 RX ADMIN — Medication SCH: at 10:39

## 2020-02-26 RX ADMIN — ATORVASTATIN CALCIUM SCH MG: 40 TABLET, FILM COATED ORAL at 21:31

## 2020-02-26 RX ADMIN — Medication PRN MG: at 21:32

## 2020-02-26 RX ADMIN — FAMOTIDINE SCH MG: 20 TABLET ORAL at 10:39

## 2020-02-26 RX ADMIN — ASPIRIN 81 MG SCH MG: 81 TABLET ORAL at 10:39

## 2020-02-27 RX ADMIN — NICOTINE SCH: 7 PATCH TRANSDERMAL at 10:44

## 2020-02-27 RX ADMIN — HYDROXYZINE PAMOATE PRN MG: 25 CAPSULE ORAL at 10:46

## 2020-02-27 RX ADMIN — FAMOTIDINE SCH MG: 20 TABLET ORAL at 21:45

## 2020-02-27 RX ADMIN — DOCUSATE SODIUM SCH: 100 CAPSULE, LIQUID FILLED ORAL at 22:01

## 2020-02-27 RX ADMIN — CLOPIDOGREL BISULFATE SCH MG: 75 TABLET, FILM COATED ORAL at 06:11

## 2020-02-27 RX ADMIN — HYDROXYZINE PAMOATE PRN MG: 25 CAPSULE ORAL at 21:48

## 2020-02-27 RX ADMIN — ATORVASTATIN CALCIUM SCH MG: 40 TABLET, FILM COATED ORAL at 21:45

## 2020-02-27 RX ADMIN — Medication SCH MG: at 21:45

## 2020-02-27 RX ADMIN — ASPIRIN 81 MG SCH MG: 81 TABLET ORAL at 10:44

## 2020-02-27 RX ADMIN — FAMOTIDINE SCH MG: 20 TABLET ORAL at 10:45

## 2020-02-27 RX ADMIN — CITALOPRAM HYDROBROMIDE SCH MG: 20 TABLET ORAL at 10:44

## 2020-02-27 RX ADMIN — Medication SCH: at 10:45

## 2020-02-27 RX ADMIN — METHADONE HYDROCHLORIDE SCH MG: 40 TABLET ORAL at 06:11

## 2020-02-28 RX ADMIN — ATORVASTATIN CALCIUM SCH MG: 40 TABLET, FILM COATED ORAL at 21:46

## 2020-02-28 RX ADMIN — Medication SCH MG: at 21:46

## 2020-02-28 RX ADMIN — CITALOPRAM HYDROBROMIDE SCH MG: 20 TABLET ORAL at 09:28

## 2020-02-28 RX ADMIN — Medication PRN MG: at 21:47

## 2020-02-28 RX ADMIN — HYDROXYZINE PAMOATE PRN MG: 25 CAPSULE ORAL at 21:45

## 2020-02-28 RX ADMIN — NICOTINE SCH: 7 PATCH TRANSDERMAL at 09:28

## 2020-02-28 RX ADMIN — HYDROXYZINE PAMOATE PRN MG: 25 CAPSULE ORAL at 09:30

## 2020-02-28 RX ADMIN — METHADONE HYDROCHLORIDE SCH MG: 40 TABLET ORAL at 06:18

## 2020-02-28 RX ADMIN — DOCUSATE SODIUM SCH MG: 100 CAPSULE, LIQUID FILLED ORAL at 21:45

## 2020-02-28 RX ADMIN — FAMOTIDINE SCH MG: 20 TABLET ORAL at 09:28

## 2020-02-28 RX ADMIN — Medication SCH: at 09:28

## 2020-02-28 RX ADMIN — FAMOTIDINE SCH MG: 20 TABLET ORAL at 21:46

## 2020-02-28 RX ADMIN — ASPIRIN 81 MG SCH MG: 81 TABLET ORAL at 09:28

## 2020-02-28 RX ADMIN — CLOPIDOGREL BISULFATE SCH MG: 75 TABLET, FILM COATED ORAL at 06:18

## 2020-02-29 RX ADMIN — CLOPIDOGREL BISULFATE SCH MG: 75 TABLET, FILM COATED ORAL at 06:25

## 2020-02-29 RX ADMIN — CITALOPRAM HYDROBROMIDE SCH MG: 20 TABLET ORAL at 10:50

## 2020-02-29 RX ADMIN — FAMOTIDINE SCH MG: 20 TABLET ORAL at 21:56

## 2020-02-29 RX ADMIN — Medication PRN MG: at 21:56

## 2020-02-29 RX ADMIN — FAMOTIDINE SCH MG: 20 TABLET ORAL at 10:51

## 2020-02-29 RX ADMIN — ASPIRIN 81 MG SCH MG: 81 TABLET ORAL at 10:50

## 2020-02-29 RX ADMIN — HYDROXYZINE PAMOATE PRN MG: 25 CAPSULE ORAL at 21:56

## 2020-02-29 RX ADMIN — NICOTINE SCH: 7 PATCH TRANSDERMAL at 10:51

## 2020-02-29 RX ADMIN — DOCUSATE SODIUM SCH: 100 CAPSULE, LIQUID FILLED ORAL at 21:56

## 2020-02-29 RX ADMIN — ATORVASTATIN CALCIUM SCH MG: 40 TABLET, FILM COATED ORAL at 21:56

## 2020-02-29 RX ADMIN — Medication SCH: at 21:58

## 2020-02-29 RX ADMIN — Medication SCH: at 10:52

## 2020-02-29 RX ADMIN — METHADONE HYDROCHLORIDE SCH MG: 40 TABLET ORAL at 05:55

## 2020-03-01 RX ADMIN — NICOTINE SCH: 7 PATCH TRANSDERMAL at 10:34

## 2020-03-01 RX ADMIN — ASPIRIN 81 MG SCH MG: 81 TABLET ORAL at 10:34

## 2020-03-01 RX ADMIN — DOCUSATE SODIUM SCH: 100 CAPSULE, LIQUID FILLED ORAL at 21:49

## 2020-03-01 RX ADMIN — HYDROXYZINE PAMOATE PRN MG: 25 CAPSULE ORAL at 21:49

## 2020-03-01 RX ADMIN — Medication SCH: at 10:34

## 2020-03-01 RX ADMIN — METHADONE HYDROCHLORIDE SCH MG: 40 TABLET ORAL at 06:01

## 2020-03-01 RX ADMIN — FAMOTIDINE SCH MG: 20 TABLET ORAL at 21:49

## 2020-03-01 RX ADMIN — CITALOPRAM HYDROBROMIDE SCH MG: 20 TABLET ORAL at 10:34

## 2020-03-01 RX ADMIN — HYDROXYZINE PAMOATE PRN MG: 25 CAPSULE ORAL at 10:35

## 2020-03-01 RX ADMIN — CLOPIDOGREL BISULFATE SCH MG: 75 TABLET, FILM COATED ORAL at 06:01

## 2020-03-01 RX ADMIN — Medication SCH: at 21:49

## 2020-03-01 RX ADMIN — ATORVASTATIN CALCIUM SCH MG: 40 TABLET, FILM COATED ORAL at 21:48

## 2020-03-01 RX ADMIN — FAMOTIDINE SCH MG: 20 TABLET ORAL at 10:34

## 2020-03-01 NOTE — PN
BHS Progress Note


Note: 





Psychiatric nurse practitioner note:


Patient scheduled for discharge tomorrow. A 30 day prescription of Celexa 20mg 

daily was electronically sent to [facility], [address] [place] [address].

## 2020-03-02 VITALS — TEMPERATURE: 97.4 F | HEART RATE: 80 BPM | DIASTOLIC BLOOD PRESSURE: 70 MMHG | SYSTOLIC BLOOD PRESSURE: 133 MMHG

## 2020-03-02 RX ADMIN — METHADONE HYDROCHLORIDE SCH MG: 40 TABLET ORAL at 06:13

## 2020-03-02 RX ADMIN — FAMOTIDINE SCH MG: 20 TABLET ORAL at 09:14

## 2020-03-02 RX ADMIN — CLOPIDOGREL BISULFATE SCH MG: 75 TABLET, FILM COATED ORAL at 06:13

## 2020-03-02 RX ADMIN — CITALOPRAM HYDROBROMIDE SCH MG: 20 TABLET ORAL at 09:13

## 2020-03-02 RX ADMIN — Medication SCH: at 09:14

## 2020-03-02 RX ADMIN — NICOTINE SCH: 7 PATCH TRANSDERMAL at 09:14

## 2020-03-02 RX ADMIN — ASPIRIN 81 MG SCH MG: 81 TABLET ORAL at 09:13

## 2020-03-02 NOTE — DS
[facility] Re[facility] Discharge Summary





- [facility] Rehab Discharge Summary


Admission Date: 02/04/20


Discharge Date: 03/02/20





- History


Present History: Alcohol dependence, Cocaine dependence, MMTP, Sedative 

dependence


Additional Comments: 





Pt is a 55 y/o male with a hx of VIJAY admitted to rehab and scheduled to 

discharge today. Pt has been referred back [address] to  follow up with 

CD aftercare. Pt reports he has a primary care provider, Dr. Card on 

[place], [place] and a cardiologist, Dr. Cristian Levin at [facility] on [address] [place] [address]. Reminded patient to 

follow up with his primary care and cardiologist for medical management of his 

cormorbid conditions. 


Pertinent Past History: 





HTN


HLD


Hep C(untreated)


GERD


Hx MI-S/P CABG with 2 stents


IVDU


Depression/Anxiety disorder


Agoraphobia





- Discharge Physical Exam


Vital Signs: 


 Vital Signs











Temperature  97.4 F L  03/02/20 07:52


 


Pulse Rate  80   03/02/20 07:52


 


Respiratory Rate  18   03/02/20 07:52


 


Blood Pressure  133/70   03/02/20 07:52


 


O2 Sat by Pulse Oximetry (%)      








Alert o x 3,denies s/h/i


nad


oob ambulating with steady gait


cardiac:s1 s2,rrr


lungs:cta,kendell.


abdomen:+bs,nt,++fatty


extremities/skin:no edema;patches of brown discoloration of skin on LE,kendell.;

skin intact.


Pertinent Admission Physical Exam Findings: 





 Laboratory Tests











  02/04/20 02/04/20 02/04/20





  06:00 06:00 06:00


 


WBC   8.5 


 


RBC   4.61 


 


Hgb   13.7 


 


Hct   41.0 


 


MCV   89.0 


 


MCH   29.7 


 


MCHC   33.4 


 


RDW   15.5 


 


Plt Count   236  D 


 


MPV   10.0 


 


Sodium    143


 


Potassium    4.3


 


Chloride    110 H


 


Carbon Dioxide    27


 


Anion Gap    5 L


 


BUN    13.2


 


Creatinine    1.0


 


Est GFR (CKD-EPI)AfAm    99.85


 


Est GFR (CKD-EPI)NonAf    86.15


 


Random Glucose    168 H


 


Calcium    9.0


 


Total Bilirubin    0.2


 


AST    24


 


ALT    49


 


Alkaline Phosphatase    124 H


 


Total Protein    7.1


 


Albumin    3.2 L


 


RPR Titer   


 


HIV 1&2 Antibody Screen  Negative  


 


HIV P24 Antigen  Negative  














  02/04/20





  06:00


 


WBC 


 


RBC 


 


Hgb 


 


Hct 


 


MCV 


 


MCH 


 


MCHC 


 


RDW 


 


Plt Count 


 


MPV 


 


Sodium 


 


Potassium 


 


Chloride 


 


Carbon Dioxide 


 


Anion Gap 


 


BUN 


 


Creatinine 


 


Est GFR (CKD-EPI)AfAm 


 


Est GFR (CKD-EPI)NonAf 


 


Random Glucose 


 


Calcium 


 


Total Bilirubin 


 


AST 


 


ALT 


 


Alkaline Phosphatase 


 


Total Protein 


 


Albumin 


 


RPR Titer  Nonreactive


 


HIV 1&2 Antibody Screen 


 


HIV P24 Antigen 














- Treatment


Discharge Condition: Discharge condition good


Hospital Course: 





Rehabilitated safely


responded well


CD aftercare referral accepted to [facility]


Attended most groups and individual sessions while in treatment.





- Medication


Discharge Medications: 


Ambulatory Orders





Citalopram Hydrobromide [Celexa -] 20 mg PO DAILY #30 tablet 08/29/19 


Lisinopril [Prinivil] 5 mg PO DAILY 15 Days #15 tablet 08/29/19 


Metoprolol Tartrate [Lopressor -] 25 mg PO BID 30 Days #30 tablet 08/29/19 


Ranitidine [Zantac -] 150 mg PO BID 30 Days #30 tablet 08/29/19 


Clonidine HCl [Catapres] 0.5 mg PO 02/04/20 


Furosemide 20 mg PO DAILY 02/05/20 


Citalopram Hydrobromide [Celexa -] 20 mg PO DAILY #30 tablet 03/01/20 


Aspirin [ASA -] 81 mg PO DAILY 15 Days #15 tab.chew 03/02/20 


Atorvastatin Ca [Lipitor] 80 mg PO HS #15 tablet 03/02/20 


Clopidogrel Bisulfate [Plavix -] 75 mg PO DAILY@0800 15 Days #15 tablet 03/02/ 20 


Famotidine [Pepcid -] 20 mg PO BID #30 tablet 03/02/20 











- Medication-Assisted Treatment (MAT)


Medication-Assisted Treatment (MAT): No





- Discharge Instructions


Diet, activity, other medical instructions: 





Diet:ANISA





Activity: oob ad yesika





Other medical instructions:follow up with CD aftercare recommendation as 

scheduled.


Follow up with primary care provider(s) as above within one week after 

discharge.








- Diagnosis


(1) Alcohol dependence


Current Visit: Yes   Status: Chronic   


Qualifiers: 


   Substance use status: uncomplicated   Qualified Code(s): F10.20 - Alcohol 

dependence, uncomplicated   





(2) Sedative hypnotic or anxiolytic dependence


Current Visit: Yes   Status: Chronic   





(3) Acid reflux


Current Visit: Yes   Status: Chronic   


Qualifiers: 


   Esophagitis presence: without esophagitis   Qualified Code(s): K21.9 - Gastro

-esophageal reflux disease without esophagitis   





(4) CAD (coronary artery disease)


Current Visit: Yes   Status: Chronic   


Qualifiers: 


   Coronary Disease-Associated Artery/Lesion type: native artery   Native vs. 

transplanted heart: native heart   Associated angina: with stable angina   

Qualified Code(s): I25.118 - Atherosclerotic heart disease of native coronary 

artery with other forms of angina pectoris   





(5) Cocaine use disorder


Current Visit: Yes   Status: Chronic   





(6) GERD (gastroesophageal reflux disease)


Current Visit: Yes   Status: Chronic   


Qualifiers: 


   Esophagitis presence: without esophagitis   Qualified Code(s): K21.9 - Gastro

-esophageal reflux disease without esophagitis   





(7) Hepatitis C


Current Visit: Yes   Status: Chronic   


Qualifiers: 


   Viral hepatitis chronicity: unspecified   Hepatic coma status: without 

hepatic coma   Qualified Code(s): B19.20 - Unspecified viral hepatitis C 

without hepatic coma   





(8) Hyperlipidemia


Current Visit: Yes   Status: Chronic   


Qualifiers: 


   Hyperlipidemia type: unspecified   Qualified Code(s): E78.5 - Hyperlipidemia

, unspecified   





(9) Hypertension


Current Visit: Yes   Status: Chronic   


Qualifiers: 


   Hypertension type: essential hypertension   Qualified Code(s): I10 - 

Essential (primary) hypertension   





(10) IVDU (intravenous drug user)


Current Visit: Yes   Status: Chronic   





(11) Methadone maintenance therapy patient


Current Visit: Yes   Status: Chronic   





(12) Nicotine dependence


Current Visit: Yes   Status: Chronic   


Qualifiers: 


   Nicotine product type: cigarettes   Substance use status: uncomplicated   

Qualified Code(s): F17.210 - Nicotine dependence, cigarettes, uncomplicated   





(13) History of heart attack


Current Visit: Yes   Status: Resolved   





- Follow-up Referral


Minutes to complete discharge: 25





- AMA


Did Patient Leave Against Medical Advice: No


Additional Comments: 





Pt requests courtesy Rx for Plavix 75 mg po daily stating "I don't have anymore 

plavix at home but I have the rest". Plavix 75 mg po daily #15; Aspirin 81 mg 

po daily #15 and Lipitor 80 mg po HS #15;Pepcid 20 mg po BID #30 tabs 

electronically sent to pt's Pharmacy-[facility] for  after 

discharge until pt gets to his primary care. Pt encouraged to follow up with 

primary care provider for re-evaluation of his medical conditions and 

medications after rehab.

## 2021-01-17 ENCOUNTER — HOSPITAL ENCOUNTER (INPATIENT)
Dept: HOSPITAL 74 - YASAS | Age: 56
LOS: 5 days | Discharge: OTHER INSTITUTION | DRG: 773 | End: 2021-01-22
Attending: ALLERGY & IMMUNOLOGY | Admitting: ALLERGY & IMMUNOLOGY
Payer: COMMERCIAL

## 2021-01-17 VITALS — BODY MASS INDEX: 32.1 KG/M2

## 2021-01-17 DIAGNOSIS — B18.2: ICD-10-CM

## 2021-01-17 DIAGNOSIS — F13.230: ICD-10-CM

## 2021-01-17 DIAGNOSIS — E78.5: ICD-10-CM

## 2021-01-17 DIAGNOSIS — F17.210: ICD-10-CM

## 2021-01-17 DIAGNOSIS — Z62.810: ICD-10-CM

## 2021-01-17 DIAGNOSIS — I25.118: ICD-10-CM

## 2021-01-17 DIAGNOSIS — F19.280: ICD-10-CM

## 2021-01-17 DIAGNOSIS — Z91.013: ICD-10-CM

## 2021-01-17 DIAGNOSIS — F14.20: ICD-10-CM

## 2021-01-17 DIAGNOSIS — F19.282: ICD-10-CM

## 2021-01-17 DIAGNOSIS — F10.230: Primary | ICD-10-CM

## 2021-01-17 DIAGNOSIS — Z95.5: ICD-10-CM

## 2021-01-17 DIAGNOSIS — I25.2: ICD-10-CM

## 2021-01-17 DIAGNOSIS — Z79.84: ICD-10-CM

## 2021-01-17 DIAGNOSIS — Z95.1: ICD-10-CM

## 2021-01-17 DIAGNOSIS — K21.9: ICD-10-CM

## 2021-01-17 DIAGNOSIS — F11.20: ICD-10-CM

## 2021-01-17 DIAGNOSIS — F32.9: ICD-10-CM

## 2021-01-17 DIAGNOSIS — E11.9: ICD-10-CM

## 2021-01-17 DIAGNOSIS — I10: ICD-10-CM

## 2021-01-17 DIAGNOSIS — F41.1: ICD-10-CM

## 2021-01-17 PROCEDURE — HZ2ZZZZ DETOXIFICATION SERVICES FOR SUBSTANCE ABUSE TREATMENT: ICD-10-PCS | Performed by: ALLERGY & IMMUNOLOGY

## 2021-01-17 PROCEDURE — U0003 INFECTIOUS AGENT DETECTION BY NUCLEIC ACID (DNA OR RNA); SEVERE ACUTE RESPIRATORY SYNDROME CORONAVIRUS 2 (SARS-COV-2) (CORONAVIRUS DISEASE [COVID-19]), AMPLIFIED PROBE TECHNIQUE, MAKING USE OF HIGH THROUGHPUT TECHNOLOGIES AS DESCRIBED BY CMS-2020-01-R: HCPCS

## 2021-01-17 PROCEDURE — C9803 HOPD COVID-19 SPEC COLLECT: HCPCS

## 2021-01-18 LAB
ALBUMIN SERPL-MCNC: 2.9 G/DL (ref 3.4–5)
ALP SERPL-CCNC: 82 U/L (ref 45–117)
ALT SERPL-CCNC: 38 U/L (ref 13–61)
ANION GAP SERPL CALC-SCNC: 4 MMOL/L (ref 8–16)
AST SERPL-CCNC: 19 U/L (ref 15–37)
BILIRUB SERPL-MCNC: 1 MG/DL (ref 0.2–1)
BUN SERPL-MCNC: 17.4 MG/DL (ref 7–18)
CALCIUM SERPL-MCNC: 8.6 MG/DL (ref 8.5–10.1)
CHLORIDE SERPL-SCNC: 108 MMOL/L (ref 98–107)
CO2 SERPL-SCNC: 30 MMOL/L (ref 21–32)
CREAT SERPL-MCNC: 1.3 MG/DL (ref 0.55–1.3)
DEPRECATED RDW RBC AUTO: 14.9 % (ref 11.9–15.9)
GLUCOSE SERPL-MCNC: 126 MG/DL (ref 74–106)
HCT VFR BLD CALC: 41.2 % (ref 35.4–49)
HGB BLD-MCNC: 13.5 GM/DL (ref 11.7–16.9)
MCH RBC QN AUTO: 28.8 PG (ref 25.7–33.7)
MCHC RBC AUTO-ENTMCNC: 32.9 G/DL (ref 32–35.9)
MCV RBC: 87.4 FL (ref 80–96)
PLATELET # BLD AUTO: 211 K/MM3 (ref 134–434)
PMV BLD: 9.5 FL (ref 7.5–11.1)
POTASSIUM SERPLBLD-SCNC: 3.8 MMOL/L (ref 3.5–5.1)
PROT SERPL-MCNC: 6.7 G/DL (ref 6.4–8.2)
RBC # BLD AUTO: 4.71 M/MM3 (ref 4–5.6)
SODIUM SERPL-SCNC: 141 MMOL/L (ref 136–145)
WBC # BLD AUTO: 7.6 K/MM3 (ref 4–10)

## 2021-01-18 RX ADMIN — Medication SCH MG: at 22:23

## 2021-01-18 RX ADMIN — ALUMINUM HYDROXIDE, MAGNESIUM HYDROXIDE, AND SIMETHICONE PRN ML: 200; 200; 20 SUSPENSION ORAL at 17:09

## 2021-01-18 RX ADMIN — Medication SCH TAB: at 09:31

## 2021-01-18 RX ADMIN — ACETAMINOPHEN PRN MG: 325 TABLET ORAL at 22:24

## 2021-01-18 RX ADMIN — HYDROXYZINE PAMOATE PRN MG: 50 CAPSULE ORAL at 18:18

## 2021-01-18 RX ADMIN — NICOTINE SCH MG: 21 PATCH TRANSDERMAL at 10:27

## 2021-01-18 RX ADMIN — CITALOPRAM HYDROBROMIDE SCH MG: 20 TABLET ORAL at 10:26

## 2021-01-19 RX ADMIN — Medication SCH MG: at 22:36

## 2021-01-19 RX ADMIN — CITALOPRAM HYDROBROMIDE SCH MG: 20 TABLET ORAL at 10:14

## 2021-01-19 RX ADMIN — Medication SCH TAB: at 10:14

## 2021-01-19 RX ADMIN — ALUMINUM HYDROXIDE, MAGNESIUM HYDROXIDE, AND SIMETHICONE PRN ML: 200; 200; 20 SUSPENSION ORAL at 18:07

## 2021-01-19 RX ADMIN — NICOTINE SCH MG: 21 PATCH TRANSDERMAL at 10:16

## 2021-01-20 RX ADMIN — Medication SCH MG: at 22:35

## 2021-01-20 RX ADMIN — NICOTINE SCH: 21 PATCH TRANSDERMAL at 10:34

## 2021-01-20 RX ADMIN — HYDROXYZINE PAMOATE PRN MG: 50 CAPSULE ORAL at 18:35

## 2021-01-20 RX ADMIN — ALUMINUM HYDROXIDE, MAGNESIUM HYDROXIDE, AND SIMETHICONE PRN ML: 200; 200; 20 SUSPENSION ORAL at 01:12

## 2021-01-20 RX ADMIN — Medication SCH TAB: at 10:34

## 2021-01-20 RX ADMIN — METHADONE HYDROCHLORIDE SCH MG: 40 TABLET ORAL at 05:39

## 2021-01-20 RX ADMIN — HYDROXYZINE PAMOATE PRN MG: 50 CAPSULE ORAL at 22:35

## 2021-01-20 RX ADMIN — CITALOPRAM HYDROBROMIDE SCH MG: 20 TABLET ORAL at 10:34

## 2021-01-21 RX ADMIN — Medication SCH MG: at 22:15

## 2021-01-21 RX ADMIN — Medication SCH TAB: at 10:20

## 2021-01-21 RX ADMIN — FAMOTIDINE SCH MG: 20 TABLET ORAL at 22:16

## 2021-01-21 RX ADMIN — FAMOTIDINE SCH MG: 20 TABLET ORAL at 11:41

## 2021-01-21 RX ADMIN — ALUMINUM HYDROXIDE, MAGNESIUM HYDROXIDE, AND SIMETHICONE PRN ML: 200; 200; 20 SUSPENSION ORAL at 10:22

## 2021-01-21 RX ADMIN — METHADONE HYDROCHLORIDE SCH MG: 40 TABLET ORAL at 05:35

## 2021-01-21 RX ADMIN — CITALOPRAM HYDROBROMIDE SCH MG: 20 TABLET ORAL at 10:20

## 2021-01-21 RX ADMIN — NICOTINE SCH: 21 PATCH TRANSDERMAL at 10:20

## 2021-01-21 RX ADMIN — ACETAMINOPHEN PRN MG: 325 TABLET ORAL at 10:22

## 2021-01-22 ENCOUNTER — HOSPITAL ENCOUNTER (INPATIENT)
Dept: HOSPITAL 74 - YASAS | Age: 56
LOS: 28 days | Discharge: HOME | DRG: 772 | End: 2021-02-19
Attending: ALLERGY & IMMUNOLOGY | Admitting: ALLERGY & IMMUNOLOGY
Payer: COMMERCIAL

## 2021-01-22 VITALS — HEART RATE: 73 BPM

## 2021-01-22 VITALS — DIASTOLIC BLOOD PRESSURE: 81 MMHG | SYSTOLIC BLOOD PRESSURE: 120 MMHG | TEMPERATURE: 97.8 F

## 2021-01-22 DIAGNOSIS — I10: ICD-10-CM

## 2021-01-22 DIAGNOSIS — Z91.013: ICD-10-CM

## 2021-01-22 DIAGNOSIS — F14.20: ICD-10-CM

## 2021-01-22 DIAGNOSIS — E66.9: ICD-10-CM

## 2021-01-22 DIAGNOSIS — F11.20: ICD-10-CM

## 2021-01-22 DIAGNOSIS — Z79.02: ICD-10-CM

## 2021-01-22 DIAGNOSIS — Z95.1: ICD-10-CM

## 2021-01-22 DIAGNOSIS — R73.9: ICD-10-CM

## 2021-01-22 DIAGNOSIS — I25.10: ICD-10-CM

## 2021-01-22 DIAGNOSIS — F10.20: Primary | ICD-10-CM

## 2021-01-22 DIAGNOSIS — F40.00: ICD-10-CM

## 2021-01-22 PROCEDURE — C9803 HOPD COVID-19 SPEC COLLECT: HCPCS

## 2021-01-22 PROCEDURE — U0003 INFECTIOUS AGENT DETECTION BY NUCLEIC ACID (DNA OR RNA); SEVERE ACUTE RESPIRATORY SYNDROME CORONAVIRUS 2 (SARS-COV-2) (CORONAVIRUS DISEASE [COVID-19]), AMPLIFIED PROBE TECHNIQUE, MAKING USE OF HIGH THROUGHPUT TECHNOLOGIES AS DESCRIBED BY CMS-2020-01-R: HCPCS

## 2021-01-22 PROCEDURE — HZ42ZZZ GROUP COUNSELING FOR SUBSTANCE ABUSE TREATMENT, COGNITIVE-BEHAVIORAL: ICD-10-PCS | Performed by: ALLERGY & IMMUNOLOGY

## 2021-01-22 RX ADMIN — METHADONE HYDROCHLORIDE SCH MG: 40 TABLET ORAL at 05:41

## 2021-01-22 RX ADMIN — Medication SCH MG: at 22:02

## 2021-01-22 RX ADMIN — Medication SCH TAB: at 10:39

## 2021-01-22 RX ADMIN — HYDROXYZINE PAMOATE PRN MG: 50 CAPSULE ORAL at 10:39

## 2021-01-22 RX ADMIN — NICOTINE SCH: 21 PATCH TRANSDERMAL at 10:39

## 2021-01-22 RX ADMIN — FAMOTIDINE SCH MG: 20 TABLET ORAL at 22:02

## 2021-01-22 RX ADMIN — CITALOPRAM HYDROBROMIDE SCH MG: 20 TABLET ORAL at 10:39

## 2021-01-22 RX ADMIN — FAMOTIDINE SCH MG: 20 TABLET ORAL at 10:39

## 2021-01-23 RX ADMIN — NICOTINE SCH: 21 PATCH TRANSDERMAL at 10:46

## 2021-01-23 RX ADMIN — Medication SCH MG: at 21:05

## 2021-01-23 RX ADMIN — FAMOTIDINE SCH MG: 20 TABLET ORAL at 21:05

## 2021-01-23 RX ADMIN — METFORMIN HYDROCHLORIDE SCH MG: 500 TABLET ORAL at 06:02

## 2021-01-23 RX ADMIN — CITALOPRAM HYDROBROMIDE SCH MG: 20 TABLET ORAL at 10:46

## 2021-01-23 RX ADMIN — Medication SCH TAB: at 10:46

## 2021-01-23 RX ADMIN — METHADONE HYDROCHLORIDE SCH MG: 40 TABLET ORAL at 06:01

## 2021-01-23 RX ADMIN — FAMOTIDINE SCH MG: 20 TABLET ORAL at 10:46

## 2021-01-24 RX ADMIN — FAMOTIDINE SCH MG: 20 TABLET ORAL at 10:27

## 2021-01-24 RX ADMIN — FAMOTIDINE SCH MG: 20 TABLET ORAL at 21:27

## 2021-01-24 RX ADMIN — Medication SCH MG: at 21:27

## 2021-01-24 RX ADMIN — METHADONE HYDROCHLORIDE SCH MG: 40 TABLET ORAL at 05:59

## 2021-01-24 RX ADMIN — Medication SCH TAB: at 10:27

## 2021-01-24 RX ADMIN — NICOTINE SCH: 21 PATCH TRANSDERMAL at 10:27

## 2021-01-24 RX ADMIN — METFORMIN HYDROCHLORIDE SCH MG: 500 TABLET ORAL at 06:00

## 2021-01-24 RX ADMIN — CITALOPRAM HYDROBROMIDE SCH MG: 20 TABLET ORAL at 10:27

## 2021-01-25 RX ADMIN — Medication SCH MG: at 21:09

## 2021-01-25 RX ADMIN — Medication SCH TAB: at 10:56

## 2021-01-25 RX ADMIN — METFORMIN HYDROCHLORIDE SCH MG: 500 TABLET ORAL at 06:15

## 2021-01-25 RX ADMIN — CITALOPRAM HYDROBROMIDE SCH MG: 20 TABLET ORAL at 10:56

## 2021-01-25 RX ADMIN — NICOTINE SCH: 21 PATCH TRANSDERMAL at 10:57

## 2021-01-25 RX ADMIN — METHADONE HYDROCHLORIDE SCH MG: 40 TABLET ORAL at 06:15

## 2021-01-25 RX ADMIN — FAMOTIDINE SCH MG: 20 TABLET ORAL at 21:08

## 2021-01-25 RX ADMIN — FAMOTIDINE SCH MG: 20 TABLET ORAL at 10:57

## 2021-01-26 RX ADMIN — CITALOPRAM HYDROBROMIDE SCH MG: 20 TABLET ORAL at 10:29

## 2021-01-26 RX ADMIN — FAMOTIDINE SCH MG: 20 TABLET ORAL at 10:29

## 2021-01-26 RX ADMIN — METHADONE HYDROCHLORIDE SCH MG: 40 TABLET ORAL at 06:20

## 2021-01-26 RX ADMIN — Medication SCH MG: at 21:26

## 2021-01-26 RX ADMIN — Medication SCH TAB: at 10:29

## 2021-01-26 RX ADMIN — METFORMIN HYDROCHLORIDE SCH MG: 500 TABLET ORAL at 06:21

## 2021-01-26 RX ADMIN — HYDROXYZINE PAMOATE PRN MG: 25 CAPSULE ORAL at 10:29

## 2021-01-26 RX ADMIN — FAMOTIDINE SCH MG: 20 TABLET ORAL at 21:26

## 2021-01-26 RX ADMIN — NICOTINE SCH: 21 PATCH TRANSDERMAL at 10:29

## 2021-01-26 RX ADMIN — ALUMINUM HYDROXIDE, MAGNESIUM HYDROXIDE, AND SIMETHICONE PRN ML: 200; 200; 20 SUSPENSION ORAL at 08:34

## 2021-01-27 RX ADMIN — Medication SCH MG: at 21:07

## 2021-01-27 RX ADMIN — CITALOPRAM HYDROBROMIDE SCH MG: 20 TABLET ORAL at 10:12

## 2021-01-27 RX ADMIN — FAMOTIDINE SCH MG: 20 TABLET ORAL at 21:08

## 2021-01-27 RX ADMIN — NICOTINE SCH MG: 21 PATCH TRANSDERMAL at 10:12

## 2021-01-27 RX ADMIN — Medication SCH TAB: at 10:12

## 2021-01-27 RX ADMIN — METFORMIN HYDROCHLORIDE SCH MG: 500 TABLET ORAL at 06:16

## 2021-01-27 RX ADMIN — FAMOTIDINE SCH MG: 20 TABLET ORAL at 10:12

## 2021-01-27 RX ADMIN — NICOTINE SCH: 21 PATCH TRANSDERMAL at 10:14

## 2021-01-27 RX ADMIN — METHADONE HYDROCHLORIDE SCH MG: 40 TABLET ORAL at 06:16

## 2021-01-27 RX ADMIN — ATORVASTATIN CALCIUM SCH MG: 80 TABLET, FILM COATED ORAL at 21:07

## 2021-01-27 RX ADMIN — HYDROXYZINE PAMOATE PRN MG: 25 CAPSULE ORAL at 10:12

## 2021-01-28 RX ADMIN — Medication SCH MG: at 21:33

## 2021-01-28 RX ADMIN — CITALOPRAM HYDROBROMIDE SCH MG: 20 TABLET ORAL at 10:41

## 2021-01-28 RX ADMIN — ATORVASTATIN CALCIUM SCH MG: 80 TABLET, FILM COATED ORAL at 21:33

## 2021-01-28 RX ADMIN — ASPIRIN SCH MG: 81 TABLET, COATED ORAL at 10:41

## 2021-01-28 RX ADMIN — METFORMIN HYDROCHLORIDE SCH MG: 500 TABLET ORAL at 06:32

## 2021-01-28 RX ADMIN — METHADONE HYDROCHLORIDE SCH MG: 40 TABLET ORAL at 05:55

## 2021-01-28 RX ADMIN — CLOPIDOGREL BISULFATE SCH MG: 75 TABLET, FILM COATED ORAL at 10:41

## 2021-01-28 RX ADMIN — Medication SCH TAB: at 10:41

## 2021-01-28 RX ADMIN — LISINOPRIL SCH MG: 5 TABLET ORAL at 10:41

## 2021-01-28 RX ADMIN — FAMOTIDINE SCH MG: 20 TABLET ORAL at 10:41

## 2021-01-28 RX ADMIN — NICOTINE SCH: 21 PATCH TRANSDERMAL at 10:41

## 2021-01-28 RX ADMIN — FAMOTIDINE SCH MG: 20 TABLET ORAL at 21:33

## 2021-01-28 RX ADMIN — HYDROXYZINE PAMOATE PRN MG: 25 CAPSULE ORAL at 10:41

## 2021-01-29 RX ADMIN — ATORVASTATIN CALCIUM SCH MG: 80 TABLET, FILM COATED ORAL at 21:04

## 2021-01-29 RX ADMIN — HYDROXYZINE PAMOATE PRN MG: 25 CAPSULE ORAL at 10:28

## 2021-01-29 RX ADMIN — NICOTINE SCH: 21 PATCH TRANSDERMAL at 10:28

## 2021-01-29 RX ADMIN — METHADONE HYDROCHLORIDE SCH MG: 40 TABLET ORAL at 06:09

## 2021-01-29 RX ADMIN — Medication SCH MG: at 21:05

## 2021-01-29 RX ADMIN — Medication SCH TAB: at 10:28

## 2021-01-29 RX ADMIN — METFORMIN HYDROCHLORIDE SCH MG: 500 TABLET ORAL at 06:09

## 2021-01-29 RX ADMIN — FAMOTIDINE SCH MG: 20 TABLET ORAL at 22:05

## 2021-01-29 RX ADMIN — LISINOPRIL SCH MG: 5 TABLET ORAL at 10:28

## 2021-01-29 RX ADMIN — CITALOPRAM HYDROBROMIDE SCH MG: 20 TABLET ORAL at 10:27

## 2021-01-29 RX ADMIN — CLOPIDOGREL BISULFATE SCH MG: 75 TABLET, FILM COATED ORAL at 10:28

## 2021-01-29 RX ADMIN — FAMOTIDINE SCH MG: 20 TABLET ORAL at 10:28

## 2021-01-29 RX ADMIN — ASPIRIN SCH MG: 81 TABLET, COATED ORAL at 10:27

## 2021-01-30 RX ADMIN — NICOTINE SCH: 21 PATCH TRANSDERMAL at 10:27

## 2021-01-30 RX ADMIN — ATORVASTATIN CALCIUM SCH MG: 80 TABLET, FILM COATED ORAL at 21:54

## 2021-01-30 RX ADMIN — LISINOPRIL SCH MG: 5 TABLET ORAL at 10:27

## 2021-01-30 RX ADMIN — Medication SCH TAB: at 10:26

## 2021-01-30 RX ADMIN — ASPIRIN SCH MG: 81 TABLET, COATED ORAL at 10:26

## 2021-01-30 RX ADMIN — Medication SCH MG: at 21:54

## 2021-01-30 RX ADMIN — METHADONE HYDROCHLORIDE SCH MG: 40 TABLET ORAL at 06:16

## 2021-01-30 RX ADMIN — CLOPIDOGREL BISULFATE SCH MG: 75 TABLET, FILM COATED ORAL at 10:26

## 2021-01-30 RX ADMIN — METFORMIN HYDROCHLORIDE SCH MG: 500 TABLET ORAL at 06:16

## 2021-01-30 RX ADMIN — FAMOTIDINE SCH MG: 20 TABLET ORAL at 10:27

## 2021-01-30 RX ADMIN — FAMOTIDINE SCH MG: 20 TABLET ORAL at 21:54

## 2021-01-30 RX ADMIN — HYDROXYZINE PAMOATE PRN MG: 25 CAPSULE ORAL at 10:27

## 2021-01-30 RX ADMIN — CITALOPRAM HYDROBROMIDE SCH MG: 20 TABLET ORAL at 10:27

## 2021-01-30 RX ADMIN — ACETAMINOPHEN PRN MG: 325 TABLET ORAL at 23:59

## 2021-01-31 RX ADMIN — ALUMINUM HYDROXIDE, MAGNESIUM HYDROXIDE, AND SIMETHICONE PRN ML: 200; 200; 20 SUSPENSION ORAL at 00:00

## 2021-01-31 RX ADMIN — Medication SCH MG: at 21:05

## 2021-01-31 RX ADMIN — LISINOPRIL SCH MG: 5 TABLET ORAL at 10:16

## 2021-01-31 RX ADMIN — FAMOTIDINE SCH MG: 20 TABLET ORAL at 10:19

## 2021-01-31 RX ADMIN — Medication SCH TAB: at 10:15

## 2021-01-31 RX ADMIN — HYDROXYZINE PAMOATE PRN MG: 25 CAPSULE ORAL at 10:15

## 2021-01-31 RX ADMIN — ATORVASTATIN CALCIUM SCH MG: 80 TABLET, FILM COATED ORAL at 21:05

## 2021-01-31 RX ADMIN — METHADONE HYDROCHLORIDE SCH MG: 40 TABLET ORAL at 05:49

## 2021-01-31 RX ADMIN — CITALOPRAM HYDROBROMIDE SCH MG: 20 TABLET ORAL at 10:16

## 2021-01-31 RX ADMIN — CLOPIDOGREL BISULFATE SCH MG: 75 TABLET, FILM COATED ORAL at 10:15

## 2021-01-31 RX ADMIN — METFORMIN HYDROCHLORIDE SCH MG: 500 TABLET ORAL at 07:37

## 2021-01-31 RX ADMIN — ASPIRIN SCH MG: 81 TABLET, COATED ORAL at 10:16

## 2021-01-31 RX ADMIN — NICOTINE SCH: 21 PATCH TRANSDERMAL at 10:19

## 2021-01-31 RX ADMIN — FAMOTIDINE SCH MG: 20 TABLET ORAL at 21:06

## 2021-02-01 RX ADMIN — HYDROXYZINE PAMOATE PRN MG: 25 CAPSULE ORAL at 21:35

## 2021-02-01 RX ADMIN — ATORVASTATIN CALCIUM SCH MG: 80 TABLET, FILM COATED ORAL at 21:35

## 2021-02-01 RX ADMIN — FAMOTIDINE SCH MG: 20 TABLET ORAL at 10:40

## 2021-02-01 RX ADMIN — ASPIRIN SCH MG: 81 TABLET, COATED ORAL at 10:39

## 2021-02-01 RX ADMIN — LISINOPRIL SCH MG: 5 TABLET ORAL at 10:39

## 2021-02-01 RX ADMIN — Medication SCH MG: at 21:34

## 2021-02-01 RX ADMIN — METHADONE HYDROCHLORIDE SCH MG: 40 TABLET ORAL at 05:59

## 2021-02-01 RX ADMIN — FAMOTIDINE SCH MG: 20 TABLET ORAL at 21:35

## 2021-02-01 RX ADMIN — HYDROXYZINE PAMOATE PRN MG: 25 CAPSULE ORAL at 10:39

## 2021-02-01 RX ADMIN — CLOPIDOGREL BISULFATE SCH MG: 75 TABLET, FILM COATED ORAL at 10:39

## 2021-02-01 RX ADMIN — NICOTINE SCH: 21 PATCH TRANSDERMAL at 10:40

## 2021-02-01 RX ADMIN — Medication SCH TAB: at 10:39

## 2021-02-01 RX ADMIN — CITALOPRAM HYDROBROMIDE SCH MG: 20 TABLET ORAL at 10:39

## 2021-02-01 RX ADMIN — METFORMIN HYDROCHLORIDE SCH MG: 500 TABLET ORAL at 06:00

## 2021-02-02 RX ADMIN — CITALOPRAM HYDROBROMIDE SCH MG: 20 TABLET ORAL at 10:57

## 2021-02-02 RX ADMIN — Medication SCH MG: at 21:04

## 2021-02-02 RX ADMIN — HYDROXYZINE PAMOATE PRN MG: 25 CAPSULE ORAL at 10:57

## 2021-02-02 RX ADMIN — ATORVASTATIN CALCIUM SCH MG: 80 TABLET, FILM COATED ORAL at 21:05

## 2021-02-02 RX ADMIN — METHADONE HYDROCHLORIDE SCH MG: 40 TABLET ORAL at 06:13

## 2021-02-02 RX ADMIN — CLOPIDOGREL BISULFATE SCH MG: 75 TABLET, FILM COATED ORAL at 10:57

## 2021-02-02 RX ADMIN — FAMOTIDINE SCH MG: 20 TABLET ORAL at 10:57

## 2021-02-02 RX ADMIN — Medication SCH TAB: at 10:57

## 2021-02-02 RX ADMIN — FAMOTIDINE SCH MG: 20 TABLET ORAL at 21:05

## 2021-02-02 RX ADMIN — ASPIRIN SCH MG: 81 TABLET, COATED ORAL at 10:57

## 2021-02-02 RX ADMIN — NICOTINE SCH: 21 PATCH TRANSDERMAL at 10:57

## 2021-02-02 RX ADMIN — LISINOPRIL SCH MG: 5 TABLET ORAL at 10:57

## 2021-02-02 RX ADMIN — METFORMIN HYDROCHLORIDE SCH MG: 500 TABLET ORAL at 06:13

## 2021-02-03 RX ADMIN — METFORMIN HYDROCHLORIDE SCH MG: 500 TABLET ORAL at 06:37

## 2021-02-03 RX ADMIN — Medication SCH TAB: at 10:58

## 2021-02-03 RX ADMIN — FAMOTIDINE SCH MG: 20 TABLET ORAL at 10:58

## 2021-02-03 RX ADMIN — CITALOPRAM HYDROBROMIDE SCH MG: 20 TABLET ORAL at 10:58

## 2021-02-03 RX ADMIN — ASPIRIN SCH MG: 81 TABLET, COATED ORAL at 10:58

## 2021-02-03 RX ADMIN — METHADONE HYDROCHLORIDE SCH MG: 40 TABLET ORAL at 06:37

## 2021-02-03 RX ADMIN — HYDROXYZINE PAMOATE PRN MG: 25 CAPSULE ORAL at 10:59

## 2021-02-03 RX ADMIN — LISINOPRIL SCH MG: 5 TABLET ORAL at 10:58

## 2021-02-03 RX ADMIN — FAMOTIDINE SCH MG: 20 TABLET ORAL at 21:23

## 2021-02-03 RX ADMIN — CLOPIDOGREL BISULFATE SCH MG: 75 TABLET, FILM COATED ORAL at 10:58

## 2021-02-03 RX ADMIN — Medication SCH MG: at 21:23

## 2021-02-03 RX ADMIN — ATORVASTATIN CALCIUM SCH MG: 80 TABLET, FILM COATED ORAL at 21:23

## 2021-02-03 RX ADMIN — NICOTINE SCH: 21 PATCH TRANSDERMAL at 10:58

## 2021-02-04 RX ADMIN — METHADONE HYDROCHLORIDE SCH MG: 40 TABLET ORAL at 06:03

## 2021-02-04 RX ADMIN — ASPIRIN SCH MG: 81 TABLET, COATED ORAL at 11:03

## 2021-02-04 RX ADMIN — CLOPIDOGREL BISULFATE SCH MG: 75 TABLET, FILM COATED ORAL at 11:03

## 2021-02-04 RX ADMIN — Medication SCH MG: at 21:12

## 2021-02-04 RX ADMIN — ATORVASTATIN CALCIUM SCH MG: 80 TABLET, FILM COATED ORAL at 21:12

## 2021-02-04 RX ADMIN — FAMOTIDINE SCH MG: 20 TABLET ORAL at 11:03

## 2021-02-04 RX ADMIN — NICOTINE SCH: 21 PATCH TRANSDERMAL at 11:04

## 2021-02-04 RX ADMIN — METFORMIN HYDROCHLORIDE SCH MG: 500 TABLET ORAL at 06:05

## 2021-02-04 RX ADMIN — Medication SCH TAB: at 11:03

## 2021-02-04 RX ADMIN — LISINOPRIL SCH MG: 5 TABLET ORAL at 11:03

## 2021-02-04 RX ADMIN — HYDROXYZINE PAMOATE PRN MG: 25 CAPSULE ORAL at 11:03

## 2021-02-04 RX ADMIN — FAMOTIDINE SCH MG: 20 TABLET ORAL at 21:12

## 2021-02-04 RX ADMIN — CITALOPRAM HYDROBROMIDE SCH MG: 20 TABLET ORAL at 11:03

## 2021-02-05 RX ADMIN — ACETAMINOPHEN PRN MG: 325 TABLET ORAL at 08:58

## 2021-02-05 RX ADMIN — METFORMIN HYDROCHLORIDE SCH MG: 500 TABLET ORAL at 06:46

## 2021-02-05 RX ADMIN — Medication SCH TAB: at 10:41

## 2021-02-05 RX ADMIN — METHADONE HYDROCHLORIDE SCH MG: 40 TABLET ORAL at 05:53

## 2021-02-05 RX ADMIN — CITALOPRAM HYDROBROMIDE SCH MG: 20 TABLET ORAL at 10:41

## 2021-02-05 RX ADMIN — HYDROXYZINE PAMOATE PRN MG: 25 CAPSULE ORAL at 10:41

## 2021-02-05 RX ADMIN — ATORVASTATIN CALCIUM SCH MG: 80 TABLET, FILM COATED ORAL at 21:31

## 2021-02-05 RX ADMIN — Medication SCH MG: at 21:31

## 2021-02-05 RX ADMIN — FAMOTIDINE SCH MG: 20 TABLET ORAL at 10:41

## 2021-02-05 RX ADMIN — HYDROXYZINE PAMOATE PRN MG: 25 CAPSULE ORAL at 21:31

## 2021-02-05 RX ADMIN — FAMOTIDINE SCH MG: 20 TABLET ORAL at 21:31

## 2021-02-05 RX ADMIN — LISINOPRIL SCH MG: 5 TABLET ORAL at 10:41

## 2021-02-05 RX ADMIN — CLOPIDOGREL BISULFATE SCH MG: 75 TABLET, FILM COATED ORAL at 10:41

## 2021-02-05 RX ADMIN — NICOTINE SCH: 21 PATCH TRANSDERMAL at 10:41

## 2021-02-05 RX ADMIN — ASPIRIN SCH MG: 81 TABLET, COATED ORAL at 10:41

## 2021-02-06 RX ADMIN — CLOPIDOGREL BISULFATE SCH MG: 75 TABLET, FILM COATED ORAL at 11:00

## 2021-02-06 RX ADMIN — METHADONE HYDROCHLORIDE SCH MG: 40 TABLET ORAL at 06:25

## 2021-02-06 RX ADMIN — Medication SCH MG: at 21:06

## 2021-02-06 RX ADMIN — Medication SCH MG: at 21:05

## 2021-02-06 RX ADMIN — CITALOPRAM HYDROBROMIDE SCH MG: 20 TABLET ORAL at 11:01

## 2021-02-06 RX ADMIN — NICOTINE SCH: 21 PATCH TRANSDERMAL at 11:02

## 2021-02-06 RX ADMIN — Medication SCH TAB: at 11:00

## 2021-02-06 RX ADMIN — FAMOTIDINE SCH MG: 20 TABLET ORAL at 11:01

## 2021-02-06 RX ADMIN — METFORMIN HYDROCHLORIDE SCH MG: 500 TABLET ORAL at 06:26

## 2021-02-06 RX ADMIN — ASPIRIN SCH MG: 81 TABLET, COATED ORAL at 11:01

## 2021-02-06 RX ADMIN — LISINOPRIL SCH MG: 5 TABLET ORAL at 11:01

## 2021-02-06 RX ADMIN — FAMOTIDINE SCH MG: 20 TABLET ORAL at 21:06

## 2021-02-06 RX ADMIN — ATORVASTATIN CALCIUM SCH MG: 80 TABLET, FILM COATED ORAL at 21:06

## 2021-02-07 RX ADMIN — Medication SCH MG: at 21:26

## 2021-02-07 RX ADMIN — Medication SCH TAB: at 09:31

## 2021-02-07 RX ADMIN — METHADONE HYDROCHLORIDE SCH MG: 40 TABLET ORAL at 06:22

## 2021-02-07 RX ADMIN — FAMOTIDINE SCH MG: 20 TABLET ORAL at 21:27

## 2021-02-07 RX ADMIN — CLOPIDOGREL BISULFATE SCH MG: 75 TABLET, FILM COATED ORAL at 09:31

## 2021-02-07 RX ADMIN — HYDROXYZINE PAMOATE PRN MG: 25 CAPSULE ORAL at 21:28

## 2021-02-07 RX ADMIN — METFORMIN HYDROCHLORIDE SCH MG: 500 TABLET ORAL at 06:23

## 2021-02-07 RX ADMIN — FAMOTIDINE SCH MG: 20 TABLET ORAL at 09:31

## 2021-02-07 RX ADMIN — ASPIRIN SCH MG: 81 TABLET, COATED ORAL at 09:31

## 2021-02-07 RX ADMIN — CITALOPRAM HYDROBROMIDE SCH MG: 20 TABLET ORAL at 09:30

## 2021-02-07 RX ADMIN — LISINOPRIL SCH MG: 5 TABLET ORAL at 09:31

## 2021-02-07 RX ADMIN — ATORVASTATIN CALCIUM SCH MG: 80 TABLET, FILM COATED ORAL at 21:28

## 2021-02-07 RX ADMIN — NICOTINE SCH: 21 PATCH TRANSDERMAL at 09:31

## 2021-02-08 RX ADMIN — FAMOTIDINE SCH MG: 20 TABLET ORAL at 21:11

## 2021-02-08 RX ADMIN — METFORMIN HYDROCHLORIDE SCH MG: 500 TABLET ORAL at 06:26

## 2021-02-08 RX ADMIN — ASPIRIN SCH MG: 81 TABLET, COATED ORAL at 10:49

## 2021-02-08 RX ADMIN — Medication SCH TAB: at 10:49

## 2021-02-08 RX ADMIN — FAMOTIDINE SCH MG: 20 TABLET ORAL at 10:49

## 2021-02-08 RX ADMIN — HYDROXYZINE PAMOATE PRN MG: 25 CAPSULE ORAL at 10:49

## 2021-02-08 RX ADMIN — NICOTINE SCH: 21 PATCH TRANSDERMAL at 10:49

## 2021-02-08 RX ADMIN — CLOPIDOGREL BISULFATE SCH MG: 75 TABLET, FILM COATED ORAL at 10:49

## 2021-02-08 RX ADMIN — CITALOPRAM HYDROBROMIDE SCH MG: 20 TABLET ORAL at 10:48

## 2021-02-08 RX ADMIN — ATORVASTATIN CALCIUM SCH MG: 80 TABLET, FILM COATED ORAL at 21:11

## 2021-02-08 RX ADMIN — Medication SCH MG: at 21:10

## 2021-02-08 RX ADMIN — METHADONE HYDROCHLORIDE SCH MG: 40 TABLET ORAL at 06:24

## 2021-02-08 RX ADMIN — LISINOPRIL SCH MG: 5 TABLET ORAL at 10:49

## 2021-02-09 RX ADMIN — Medication SCH TAB: at 10:38

## 2021-02-09 RX ADMIN — ASPIRIN SCH MG: 81 TABLET, COATED ORAL at 10:39

## 2021-02-09 RX ADMIN — Medication SCH MG: at 21:10

## 2021-02-09 RX ADMIN — CLOPIDOGREL BISULFATE SCH MG: 75 TABLET, FILM COATED ORAL at 10:43

## 2021-02-09 RX ADMIN — LISINOPRIL SCH MG: 5 TABLET ORAL at 10:39

## 2021-02-09 RX ADMIN — FAMOTIDINE SCH MG: 20 TABLET ORAL at 21:11

## 2021-02-09 RX ADMIN — NICOTINE SCH: 21 PATCH TRANSDERMAL at 10:42

## 2021-02-09 RX ADMIN — METFORMIN HYDROCHLORIDE SCH MG: 500 TABLET ORAL at 06:24

## 2021-02-09 RX ADMIN — FAMOTIDINE SCH MG: 20 TABLET ORAL at 10:39

## 2021-02-09 RX ADMIN — ATORVASTATIN CALCIUM SCH MG: 80 TABLET, FILM COATED ORAL at 21:10

## 2021-02-09 RX ADMIN — CITALOPRAM HYDROBROMIDE SCH MG: 20 TABLET ORAL at 10:39

## 2021-02-09 RX ADMIN — METHADONE HYDROCHLORIDE SCH MG: 40 TABLET ORAL at 05:53

## 2021-02-10 RX ADMIN — Medication SCH TAB: at 10:00

## 2021-02-10 RX ADMIN — FAMOTIDINE SCH MG: 20 TABLET ORAL at 21:34

## 2021-02-10 RX ADMIN — CITALOPRAM HYDROBROMIDE SCH MG: 20 TABLET ORAL at 10:01

## 2021-02-10 RX ADMIN — FAMOTIDINE SCH MG: 20 TABLET ORAL at 10:01

## 2021-02-10 RX ADMIN — LISINOPRIL SCH MG: 5 TABLET ORAL at 10:01

## 2021-02-10 RX ADMIN — ASPIRIN SCH MG: 81 TABLET, COATED ORAL at 10:00

## 2021-02-10 RX ADMIN — Medication SCH MG: at 21:34

## 2021-02-10 RX ADMIN — METHADONE HYDROCHLORIDE SCH MG: 40 TABLET ORAL at 06:03

## 2021-02-10 RX ADMIN — ATORVASTATIN CALCIUM SCH MG: 80 TABLET, FILM COATED ORAL at 21:34

## 2021-02-10 RX ADMIN — METFORMIN HYDROCHLORIDE SCH MG: 500 TABLET ORAL at 06:04

## 2021-02-10 RX ADMIN — CLOPIDOGREL BISULFATE SCH MG: 75 TABLET, FILM COATED ORAL at 10:01

## 2021-02-10 RX ADMIN — NICOTINE SCH: 21 PATCH TRANSDERMAL at 10:01

## 2021-02-11 RX ADMIN — FAMOTIDINE SCH MG: 20 TABLET ORAL at 10:15

## 2021-02-11 RX ADMIN — FAMOTIDINE SCH MG: 20 TABLET ORAL at 21:12

## 2021-02-11 RX ADMIN — Medication SCH MG: at 21:12

## 2021-02-11 RX ADMIN — ASPIRIN SCH MG: 81 TABLET, COATED ORAL at 10:15

## 2021-02-11 RX ADMIN — CLOPIDOGREL BISULFATE SCH MG: 75 TABLET, FILM COATED ORAL at 10:15

## 2021-02-11 RX ADMIN — ATORVASTATIN CALCIUM SCH MG: 80 TABLET, FILM COATED ORAL at 21:12

## 2021-02-11 RX ADMIN — LISINOPRIL SCH MG: 5 TABLET ORAL at 10:14

## 2021-02-11 RX ADMIN — METFORMIN HYDROCHLORIDE SCH MG: 500 TABLET ORAL at 06:05

## 2021-02-11 RX ADMIN — NICOTINE SCH: 21 PATCH TRANSDERMAL at 10:15

## 2021-02-11 RX ADMIN — CITALOPRAM HYDROBROMIDE SCH MG: 20 TABLET ORAL at 10:15

## 2021-02-11 RX ADMIN — Medication SCH TAB: at 10:14

## 2021-02-11 RX ADMIN — METHADONE HYDROCHLORIDE SCH MG: 40 TABLET ORAL at 06:03

## 2021-02-12 RX ADMIN — ASPIRIN SCH MG: 81 TABLET, COATED ORAL at 10:45

## 2021-02-12 RX ADMIN — LISINOPRIL SCH MG: 5 TABLET ORAL at 10:45

## 2021-02-12 RX ADMIN — FAMOTIDINE SCH MG: 20 TABLET ORAL at 10:45

## 2021-02-12 RX ADMIN — ATORVASTATIN CALCIUM SCH MG: 80 TABLET, FILM COATED ORAL at 21:54

## 2021-02-12 RX ADMIN — FAMOTIDINE SCH MG: 20 TABLET ORAL at 21:54

## 2021-02-12 RX ADMIN — NICOTINE SCH: 21 PATCH TRANSDERMAL at 10:45

## 2021-02-12 RX ADMIN — Medication SCH TAB: at 10:44

## 2021-02-12 RX ADMIN — METHADONE HYDROCHLORIDE SCH MG: 40 TABLET ORAL at 06:24

## 2021-02-12 RX ADMIN — METFORMIN HYDROCHLORIDE SCH MG: 500 TABLET ORAL at 06:26

## 2021-02-12 RX ADMIN — CLOPIDOGREL BISULFATE SCH MG: 75 TABLET, FILM COATED ORAL at 10:45

## 2021-02-12 RX ADMIN — CITALOPRAM HYDROBROMIDE SCH MG: 20 TABLET ORAL at 10:45

## 2021-02-12 RX ADMIN — Medication SCH MG: at 21:54

## 2021-02-12 RX ADMIN — Medication SCH MG: at 21:55

## 2021-02-12 RX ADMIN — HYDROXYZINE PAMOATE PRN MG: 25 CAPSULE ORAL at 10:45

## 2021-02-13 RX ADMIN — Medication SCH TAB: at 10:06

## 2021-02-13 RX ADMIN — Medication SCH MG: at 21:15

## 2021-02-13 RX ADMIN — FAMOTIDINE SCH MG: 20 TABLET ORAL at 21:15

## 2021-02-13 RX ADMIN — ASPIRIN SCH MG: 81 TABLET, COATED ORAL at 10:08

## 2021-02-13 RX ADMIN — CITALOPRAM HYDROBROMIDE SCH MG: 20 TABLET ORAL at 10:06

## 2021-02-13 RX ADMIN — CLOPIDOGREL BISULFATE SCH MG: 75 TABLET, FILM COATED ORAL at 10:07

## 2021-02-13 RX ADMIN — LISINOPRIL SCH MG: 5 TABLET ORAL at 10:06

## 2021-02-13 RX ADMIN — METFORMIN HYDROCHLORIDE SCH MG: 500 TABLET ORAL at 06:03

## 2021-02-13 RX ADMIN — FAMOTIDINE SCH MG: 20 TABLET ORAL at 10:06

## 2021-02-13 RX ADMIN — NICOTINE SCH: 21 PATCH TRANSDERMAL at 10:07

## 2021-02-13 RX ADMIN — METHADONE HYDROCHLORIDE SCH MG: 40 TABLET ORAL at 06:02

## 2021-02-13 RX ADMIN — ATORVASTATIN CALCIUM SCH MG: 80 TABLET, FILM COATED ORAL at 21:16

## 2021-02-14 RX ADMIN — FAMOTIDINE SCH MG: 20 TABLET ORAL at 10:09

## 2021-02-14 RX ADMIN — ASPIRIN SCH MG: 81 TABLET, COATED ORAL at 10:11

## 2021-02-14 RX ADMIN — Medication SCH MG: at 21:46

## 2021-02-14 RX ADMIN — ATORVASTATIN CALCIUM SCH MG: 80 TABLET, FILM COATED ORAL at 21:47

## 2021-02-14 RX ADMIN — FAMOTIDINE SCH MG: 20 TABLET ORAL at 21:47

## 2021-02-14 RX ADMIN — METFORMIN HYDROCHLORIDE SCH MG: 500 TABLET ORAL at 06:04

## 2021-02-14 RX ADMIN — METHADONE HYDROCHLORIDE SCH MG: 40 TABLET ORAL at 06:04

## 2021-02-14 RX ADMIN — HYDROXYZINE PAMOATE PRN MG: 25 CAPSULE ORAL at 21:47

## 2021-02-14 RX ADMIN — Medication SCH TAB: at 10:09

## 2021-02-14 RX ADMIN — CLOPIDOGREL BISULFATE SCH MG: 75 TABLET, FILM COATED ORAL at 10:09

## 2021-02-14 RX ADMIN — NICOTINE SCH: 21 PATCH TRANSDERMAL at 10:10

## 2021-02-14 RX ADMIN — CITALOPRAM HYDROBROMIDE SCH MG: 20 TABLET ORAL at 10:09

## 2021-02-14 RX ADMIN — LISINOPRIL SCH MG: 5 TABLET ORAL at 10:09

## 2021-02-15 RX ADMIN — CLOPIDOGREL BISULFATE SCH MG: 75 TABLET, FILM COATED ORAL at 10:01

## 2021-02-15 RX ADMIN — CITALOPRAM HYDROBROMIDE SCH MG: 20 TABLET ORAL at 10:01

## 2021-02-15 RX ADMIN — ASPIRIN SCH MG: 81 TABLET, COATED ORAL at 10:01

## 2021-02-15 RX ADMIN — Medication SCH TAB: at 10:01

## 2021-02-15 RX ADMIN — LISINOPRIL SCH MG: 5 TABLET ORAL at 10:01

## 2021-02-15 RX ADMIN — Medication SCH MG: at 21:35

## 2021-02-15 RX ADMIN — NICOTINE SCH: 21 PATCH TRANSDERMAL at 10:02

## 2021-02-15 RX ADMIN — ATORVASTATIN CALCIUM SCH MG: 80 TABLET, FILM COATED ORAL at 21:35

## 2021-02-15 RX ADMIN — FAMOTIDINE SCH MG: 20 TABLET ORAL at 21:36

## 2021-02-15 RX ADMIN — METFORMIN HYDROCHLORIDE SCH MG: 500 TABLET ORAL at 06:12

## 2021-02-15 RX ADMIN — METHADONE HYDROCHLORIDE SCH MG: 40 TABLET ORAL at 06:11

## 2021-02-15 RX ADMIN — FAMOTIDINE SCH MG: 20 TABLET ORAL at 10:02

## 2021-02-16 RX ADMIN — CLOPIDOGREL BISULFATE SCH MG: 75 TABLET, FILM COATED ORAL at 10:07

## 2021-02-16 RX ADMIN — METHADONE HYDROCHLORIDE SCH MG: 40 TABLET ORAL at 06:02

## 2021-02-16 RX ADMIN — Medication SCH TAB: at 10:06

## 2021-02-16 RX ADMIN — FAMOTIDINE SCH MG: 20 TABLET ORAL at 10:07

## 2021-02-16 RX ADMIN — ASPIRIN SCH MG: 81 TABLET, COATED ORAL at 10:07

## 2021-02-16 RX ADMIN — FAMOTIDINE SCH MG: 20 TABLET ORAL at 21:18

## 2021-02-16 RX ADMIN — NICOTINE SCH: 21 PATCH TRANSDERMAL at 10:08

## 2021-02-16 RX ADMIN — LISINOPRIL SCH MG: 5 TABLET ORAL at 10:07

## 2021-02-16 RX ADMIN — CITALOPRAM HYDROBROMIDE SCH MG: 20 TABLET ORAL at 10:07

## 2021-02-16 RX ADMIN — Medication SCH MG: at 21:17

## 2021-02-16 RX ADMIN — METFORMIN HYDROCHLORIDE SCH MG: 500 TABLET ORAL at 06:04

## 2021-02-16 RX ADMIN — ATORVASTATIN CALCIUM SCH MG: 80 TABLET, FILM COATED ORAL at 21:17

## 2021-02-17 RX ADMIN — NICOTINE SCH: 21 PATCH TRANSDERMAL at 09:37

## 2021-02-17 RX ADMIN — Medication SCH MG: at 21:08

## 2021-02-17 RX ADMIN — CLOPIDOGREL BISULFATE SCH MG: 75 TABLET, FILM COATED ORAL at 09:37

## 2021-02-17 RX ADMIN — FAMOTIDINE SCH MG: 20 TABLET ORAL at 21:08

## 2021-02-17 RX ADMIN — ATORVASTATIN CALCIUM SCH MG: 80 TABLET, FILM COATED ORAL at 21:08

## 2021-02-17 RX ADMIN — LISINOPRIL SCH MG: 5 TABLET ORAL at 09:37

## 2021-02-17 RX ADMIN — METFORMIN HYDROCHLORIDE SCH MG: 500 TABLET ORAL at 06:13

## 2021-02-17 RX ADMIN — METHADONE HYDROCHLORIDE SCH MG: 40 TABLET ORAL at 06:11

## 2021-02-17 RX ADMIN — FAMOTIDINE SCH MG: 20 TABLET ORAL at 09:37

## 2021-02-17 RX ADMIN — CITALOPRAM HYDROBROMIDE SCH MG: 20 TABLET ORAL at 09:37

## 2021-02-17 RX ADMIN — Medication SCH TAB: at 09:37

## 2021-02-17 RX ADMIN — ASPIRIN SCH MG: 81 TABLET, COATED ORAL at 09:37

## 2021-02-18 RX ADMIN — Medication SCH MG: at 21:15

## 2021-02-18 RX ADMIN — ATORVASTATIN CALCIUM SCH MG: 80 TABLET, FILM COATED ORAL at 21:15

## 2021-02-18 RX ADMIN — ASPIRIN SCH MG: 81 TABLET, COATED ORAL at 10:08

## 2021-02-18 RX ADMIN — METHADONE HYDROCHLORIDE SCH MG: 40 TABLET ORAL at 06:17

## 2021-02-18 RX ADMIN — CLOPIDOGREL BISULFATE SCH MG: 75 TABLET, FILM COATED ORAL at 10:08

## 2021-02-18 RX ADMIN — LISINOPRIL SCH MG: 5 TABLET ORAL at 10:08

## 2021-02-18 RX ADMIN — FAMOTIDINE SCH MG: 20 TABLET ORAL at 21:15

## 2021-02-18 RX ADMIN — NICOTINE SCH: 21 PATCH TRANSDERMAL at 10:08

## 2021-02-18 RX ADMIN — METFORMIN HYDROCHLORIDE SCH MG: 500 TABLET ORAL at 06:18

## 2021-02-18 RX ADMIN — Medication SCH TAB: at 10:08

## 2021-02-18 RX ADMIN — FAMOTIDINE SCH MG: 20 TABLET ORAL at 10:08

## 2021-02-18 RX ADMIN — CITALOPRAM HYDROBROMIDE SCH MG: 20 TABLET ORAL at 10:08

## 2021-02-19 VITALS — TEMPERATURE: 97.5 F

## 2021-02-19 VITALS — DIASTOLIC BLOOD PRESSURE: 66 MMHG | HEART RATE: 103 BPM | SYSTOLIC BLOOD PRESSURE: 121 MMHG

## 2021-02-19 RX ADMIN — CITALOPRAM HYDROBROMIDE SCH MG: 20 TABLET ORAL at 09:38

## 2021-02-19 RX ADMIN — ASPIRIN SCH MG: 81 TABLET, COATED ORAL at 09:38

## 2021-02-19 RX ADMIN — LISINOPRIL SCH MG: 5 TABLET ORAL at 09:38

## 2021-02-19 RX ADMIN — NICOTINE SCH: 21 PATCH TRANSDERMAL at 09:38

## 2021-02-19 RX ADMIN — METFORMIN HYDROCHLORIDE SCH MG: 500 TABLET ORAL at 06:25

## 2021-02-19 RX ADMIN — Medication SCH TAB: at 09:38

## 2021-02-19 RX ADMIN — METHADONE HYDROCHLORIDE SCH MG: 40 TABLET ORAL at 06:25

## 2021-02-19 RX ADMIN — CLOPIDOGREL BISULFATE SCH MG: 75 TABLET, FILM COATED ORAL at 09:38

## 2021-02-19 RX ADMIN — FAMOTIDINE SCH MG: 20 TABLET ORAL at 09:38
